# Patient Record
Sex: FEMALE | Race: BLACK OR AFRICAN AMERICAN | Employment: PART TIME | URBAN - METROPOLITAN AREA
[De-identification: names, ages, dates, MRNs, and addresses within clinical notes are randomized per-mention and may not be internally consistent; named-entity substitution may affect disease eponyms.]

---

## 2019-07-25 ENCOUNTER — HOSPITAL ENCOUNTER (EMERGENCY)
Age: 69
Discharge: HOME | End: 2019-07-25
Attending: EMERGENCY MEDICINE
Payer: MEDICARE

## 2019-07-25 VITALS
OXYGEN SATURATION: 100 % | RESPIRATION RATE: 18 BRPM | TEMPERATURE: 98 F | DIASTOLIC BLOOD PRESSURE: 56 MMHG | SYSTOLIC BLOOD PRESSURE: 134 MMHG | BODY MASS INDEX: 33.04 KG/M2 | HEART RATE: 88 BPM | WEIGHT: 218 LBS | HEIGHT: 68 IN

## 2019-07-25 DIAGNOSIS — R10.84 GENERALIZED ABDOMINAL PAIN: Primary | ICD-10-CM

## 2019-07-25 DIAGNOSIS — R19.7 NAUSEA VOMITING AND DIARRHEA: ICD-10-CM

## 2019-07-25 DIAGNOSIS — E86.0 DEHYDRATION: ICD-10-CM

## 2019-07-25 DIAGNOSIS — R11.2 NAUSEA VOMITING AND DIARRHEA: ICD-10-CM

## 2019-07-25 LAB
ABSOLUTE EOS #: 0 K/UL (ref 0–0.4)
ABSOLUTE IMMATURE GRANULOCYTE: 0 K/UL (ref 0–0.3)
ABSOLUTE LYMPH #: 0.86 K/UL (ref 1–4.8)
ABSOLUTE MONO #: 0.11 K/UL (ref 0.2–0.8)
ALBUMIN SERPL-MCNC: 4.3 G/DL (ref 3.5–5.2)
ALBUMIN/GLOBULIN RATIO: NORMAL (ref 1–2.5)
ALP BLD-CCNC: 86 U/L (ref 35–104)
ALT SERPL-CCNC: 21 U/L (ref 5–33)
ANION GAP SERPL CALCULATED.3IONS-SCNC: 12 MMOL/L (ref 9–17)
AST SERPL-CCNC: 22 U/L
BASOPHILS # BLD: 0 %
BASOPHILS ABSOLUTE: 0 K/UL (ref 0–0.2)
BILIRUB SERPL-MCNC: 0.39 MG/DL (ref 0.3–1.2)
BILIRUBIN DIRECT: <0.08 MG/DL
BILIRUBIN, INDIRECT: NORMAL MG/DL (ref 0–1)
BUN BLDV-MCNC: 14 MG/DL (ref 8–23)
BUN/CREAT BLD: 25 (ref 9–20)
CALCIUM SERPL-MCNC: 9.4 MG/DL (ref 8.6–10.4)
CHLORIDE BLD-SCNC: 102 MMOL/L (ref 98–107)
CO2: 23 MMOL/L (ref 20–31)
CREAT SERPL-MCNC: 0.55 MG/DL (ref 0.5–0.9)
DIFFERENTIAL TYPE: ABNORMAL
EOSINOPHILS RELATIVE PERCENT: 0 % (ref 1–4)
GFR AFRICAN AMERICAN: >60 ML/MIN
GFR NON-AFRICAN AMERICAN: >60 ML/MIN
GFR SERPL CREATININE-BSD FRML MDRD: ABNORMAL ML/MIN/{1.73_M2}
GFR SERPL CREATININE-BSD FRML MDRD: ABNORMAL ML/MIN/{1.73_M2}
GLOBULIN: NORMAL G/DL (ref 1.5–3.8)
GLUCOSE BLD-MCNC: 141 MG/DL (ref 70–99)
HCT VFR BLD CALC: 40 % (ref 36.3–47.1)
HEMOGLOBIN: 12.7 G/DL (ref 11.9–15.1)
IMMATURE GRANULOCYTES: 0 %
LIPASE: 28 U/L (ref 13–60)
LYMPHOCYTES # BLD: 8 % (ref 24–44)
MCH RBC QN AUTO: 27.5 PG (ref 25.2–33.5)
MCHC RBC AUTO-ENTMCNC: 31.8 G/DL (ref 28.4–34.8)
MCV RBC AUTO: 86.6 FL (ref 82.6–102.9)
MONOCYTES # BLD: 1 % (ref 1–7)
NRBC AUTOMATED: 0 PER 100 WBC
PDW BLD-RTO: 13.5 % (ref 11.8–14.4)
PLATELET # BLD: 183 K/UL (ref 138–453)
PLATELET ESTIMATE: ABNORMAL
PMV BLD AUTO: 12 FL (ref 8.1–13.5)
POTASSIUM SERPL-SCNC: 3.9 MMOL/L (ref 3.7–5.3)
RBC # BLD: 4.62 M/UL (ref 3.95–5.11)
RBC # BLD: ABNORMAL 10*6/UL
SEG NEUTROPHILS: 91 % (ref 36–66)
SEGMENTED NEUTROPHILS ABSOLUTE COUNT: 9.73 K/UL (ref 1.8–7.7)
SODIUM BLD-SCNC: 137 MMOL/L (ref 135–144)
TOTAL PROTEIN: 7.8 G/DL (ref 6.4–8.3)
WBC # BLD: 10.7 K/UL (ref 3.5–11.3)
WBC # BLD: ABNORMAL 10*3/UL

## 2019-07-25 PROCEDURE — 85025 COMPLETE CBC W/AUTO DIFF WBC: CPT

## 2019-07-25 PROCEDURE — 2580000003 HC RX 258: Performed by: EMERGENCY MEDICINE

## 2019-07-25 PROCEDURE — 6360000002 HC RX W HCPCS: Performed by: EMERGENCY MEDICINE

## 2019-07-25 PROCEDURE — 99284 EMERGENCY DEPT VISIT MOD MDM: CPT

## 2019-07-25 PROCEDURE — 2500000003 HC RX 250 WO HCPCS: Performed by: EMERGENCY MEDICINE

## 2019-07-25 PROCEDURE — 96375 TX/PRO/DX INJ NEW DRUG ADDON: CPT

## 2019-07-25 PROCEDURE — 80076 HEPATIC FUNCTION PANEL: CPT

## 2019-07-25 PROCEDURE — 80048 BASIC METABOLIC PNL TOTAL CA: CPT

## 2019-07-25 PROCEDURE — 96374 THER/PROPH/DIAG INJ IV PUSH: CPT

## 2019-07-25 PROCEDURE — 83690 ASSAY OF LIPASE: CPT

## 2019-07-25 PROCEDURE — 81003 URINALYSIS AUTO W/O SCOPE: CPT

## 2019-07-25 PROCEDURE — 36415 COLL VENOUS BLD VENIPUNCTURE: CPT

## 2019-07-25 RX ORDER — 0.9 % SODIUM CHLORIDE 0.9 %
1000 INTRAVENOUS SOLUTION INTRAVENOUS ONCE
Status: COMPLETED | OUTPATIENT
Start: 2019-07-25 | End: 2019-07-25

## 2019-07-25 RX ORDER — ONDANSETRON 2 MG/ML
4 INJECTION INTRAMUSCULAR; INTRAVENOUS ONCE
Status: COMPLETED | OUTPATIENT
Start: 2019-07-25 | End: 2019-07-25

## 2019-07-25 RX ADMIN — SODIUM CHLORIDE 1000 ML: 9 INJECTION, SOLUTION INTRAVENOUS at 20:34

## 2019-07-25 RX ADMIN — FAMOTIDINE 20 MG: 10 INJECTION, SOLUTION INTRAVENOUS at 20:35

## 2019-07-25 RX ADMIN — ONDANSETRON 4 MG: 2 INJECTION INTRAMUSCULAR; INTRAVENOUS at 20:35

## 2019-07-25 SDOH — HEALTH STABILITY: MENTAL HEALTH: HOW OFTEN DO YOU HAVE A DRINK CONTAINING ALCOHOL?: NEVER

## 2019-07-25 ASSESSMENT — PAIN DESCRIPTION - ORIENTATION: ORIENTATION: MID;UPPER

## 2019-07-25 ASSESSMENT — ENCOUNTER SYMPTOMS
COLOR CHANGE: 0
SHORTNESS OF BREATH: 0
COUGH: 0
NAUSEA: 0
SORE THROAT: 0
CONSTIPATION: 0
VOMITING: 0
DIARRHEA: 0
FACIAL SWELLING: 0
CHEST TIGHTNESS: 0
SINUS PRESSURE: 0

## 2019-07-25 ASSESSMENT — PAIN DESCRIPTION - DESCRIPTORS: DESCRIPTORS: SHARP;STABBING

## 2019-07-25 ASSESSMENT — PAIN DESCRIPTION - LOCATION: LOCATION: ABDOMEN

## 2019-07-25 ASSESSMENT — PAIN SCALES - GENERAL: PAINLEVEL_OUTOF10: 10

## 2019-07-25 ASSESSMENT — PAIN DESCRIPTION - PAIN TYPE: TYPE: ACUTE PAIN

## 2019-07-26 NOTE — ED PROVIDER NOTES
[address] [place] [address] ED  EMERGENCY DEPARTMENT ENCOUNTER      Pt Name: Bonita Badillo  MRN: [id number]  [place] 1950  Date of evaluation: 7/25/19      CHIEF COMPLAINT       Chief Complaint   Patient presents with    Emesis     w/ excessive belching    Abdominal Pain     mid upper abd pain today    Diarrhea     today         HISTORY OF PRESENT ILLNESS    Bonita Badillo is a 68 y.o. female who presents complaining of abdominal pain, nausea, vomiting, diarrhea. 64-year-old -American female presents emergency department with the above-mentioned concerns. Patient states she had a normal day she ate normal food she just felt like she was getting little weak perhaps even a little dehydrated while she was at work. This subsequently then led to some nausea vomiting and diarrhea. At this time the patient is alert and in no acute distress. Here today for further evaluation and treatment. REVIEW OF SYSTEMS       Review of Systems   Constitutional: Negative for chills, diaphoresis and fever. HENT: Negative for facial swelling, sinus pressure and sore throat. Eyes: Negative for visual disturbance. Respiratory: Negative for cough, chest tightness and shortness of breath. Cardiovascular: Negative for chest pain. Gastrointestinal: Negative for constipation, diarrhea, nausea and vomiting. Musculoskeletal: Negative for arthralgias and myalgias. Skin: Negative for color change and rash. Neurological: Negative for weakness and headaches. Psychiatric/Behavioral: Negative for agitation, hallucinations and self-injury.        PAST MEDICAL HISTORY     Past Medical History:   Diagnosis Date    [place] (hard of hearing)     on 7/25/19 pt states she left her hearing aides at home       SURGICAL HISTORY       Past Surgical History:   Procedure Laterality Date    HYSTERECTOMY         CURRENT MEDICATIONS       Previous Medications    No medications on file       ALLERGIES     has No Known

## 2021-06-28 ENCOUNTER — APPOINTMENT (OUTPATIENT)
Dept: CT IMAGING | Age: 71
End: 2021-06-28
Payer: MEDICARE

## 2021-06-28 ENCOUNTER — HOSPITAL ENCOUNTER (EMERGENCY)
Age: 71
Discharge: HOME | End: 2021-06-28
Attending: EMERGENCY MEDICINE
Payer: MEDICARE

## 2021-06-28 ENCOUNTER — APPOINTMENT (OUTPATIENT)
Dept: GENERAL RADIOLOGY | Age: 71
End: 2021-06-28
Payer: MEDICARE

## 2021-06-28 VITALS
OXYGEN SATURATION: 97 % | HEART RATE: 97 BPM | RESPIRATION RATE: 16 BRPM | TEMPERATURE: 98.2 F | WEIGHT: 220 LBS | DIASTOLIC BLOOD PRESSURE: 71 MMHG | HEIGHT: 68 IN | SYSTOLIC BLOOD PRESSURE: 165 MMHG | BODY MASS INDEX: 33.34 KG/M2

## 2021-06-28 DIAGNOSIS — R51.9 ACUTE NONINTRACTABLE HEADACHE, UNSPECIFIED HEADACHE TYPE: Primary | ICD-10-CM

## 2021-06-28 DIAGNOSIS — J06.9 VIRAL URI: ICD-10-CM

## 2021-06-28 LAB
ABSOLUTE EOS #: 0.03 K/UL (ref 0–0.44)
ABSOLUTE IMMATURE GRANULOCYTE: 0.02 K/UL (ref 0–0.3)
ABSOLUTE LYMPH #: 1.29 K/UL (ref 1.1–3.7)
ABSOLUTE MONO #: 0.29 K/UL (ref 0.1–1.2)
ANION GAP SERPL CALCULATED.3IONS-SCNC: 12 MMOL/L (ref 9–17)
BASOPHILS # BLD: 0 % (ref 0–2)
BASOPHILS ABSOLUTE: <0.03 K/UL (ref 0–0.2)
BUN BLDV-MCNC: 8 MG/DL (ref 8–23)
BUN/CREAT BLD: 15 (ref 9–20)
CALCIUM SERPL-MCNC: 9.8 MG/DL (ref 8.6–10.4)
CHLORIDE BLD-SCNC: 104 MMOL/L (ref 98–107)
CO2: 24 MMOL/L (ref 20–31)
CREAT SERPL-MCNC: 0.52 MG/DL (ref 0.5–0.9)
DIFFERENTIAL TYPE: ABNORMAL
EOSINOPHILS RELATIVE PERCENT: 0 % (ref 1–4)
GFR AFRICAN AMERICAN: >60 ML/MIN
GFR NON-AFRICAN AMERICAN: >60 ML/MIN
GFR SERPL CREATININE-BSD FRML MDRD: ABNORMAL ML/MIN/{1.73_M2}
GFR SERPL CREATININE-BSD FRML MDRD: ABNORMAL ML/MIN/{1.73_M2}
GLUCOSE BLD-MCNC: 115 MG/DL (ref 70–99)
HCT VFR BLD CALC: 38.5 % (ref 36.3–47.1)
HEMOGLOBIN: 12.2 G/DL (ref 11.9–15.1)
IMMATURE GRANULOCYTES: 0 %
LYMPHOCYTES # BLD: 19 % (ref 24–43)
MCH RBC QN AUTO: 27.7 PG (ref 25.2–33.5)
MCHC RBC AUTO-ENTMCNC: 31.7 G/DL (ref 28.4–34.8)
MCV RBC AUTO: 87.5 FL (ref 82.6–102.9)
MONOCYTES # BLD: 4 % (ref 3–12)
NRBC AUTOMATED: 0 PER 100 WBC
PDW BLD-RTO: 13.5 % (ref 11.8–14.4)
PLATELET # BLD: 202 K/UL (ref 138–453)
PLATELET ESTIMATE: ABNORMAL
PMV BLD AUTO: 11.2 FL (ref 8.1–13.5)
POTASSIUM SERPL-SCNC: 4.4 MMOL/L (ref 3.7–5.3)
RBC # BLD: 4.4 M/UL (ref 3.95–5.11)
RBC # BLD: ABNORMAL 10*6/UL
SARS-COV-2, RAPID: NOT DETECTED
SEG NEUTROPHILS: 77 % (ref 36–65)
SEGMENTED NEUTROPHILS ABSOLUTE COUNT: 5.24 K/UL (ref 1.5–8.1)
SODIUM BLD-SCNC: 140 MMOL/L (ref 135–144)
SPECIMEN DESCRIPTION: NORMAL
TROPONIN INTERP: NORMAL
TROPONIN T: NORMAL NG/ML
TROPONIN, HIGH SENSITIVITY: 12 NG/L (ref 0–14)
WBC # BLD: 6.9 K/UL (ref 3.5–11.3)
WBC # BLD: ABNORMAL 10*3/UL

## 2021-06-28 PROCEDURE — 99283 EMERGENCY DEPT VISIT LOW MDM: CPT

## 2021-06-28 PROCEDURE — 80048 BASIC METABOLIC PNL TOTAL CA: CPT

## 2021-06-28 PROCEDURE — 70450 CT HEAD/BRAIN W/O DYE: CPT

## 2021-06-28 PROCEDURE — 93005 ELECTROCARDIOGRAM TRACING: CPT | Performed by: EMERGENCY MEDICINE

## 2021-06-28 PROCEDURE — 71045 X-RAY EXAM CHEST 1 VIEW: CPT

## 2021-06-28 PROCEDURE — 87635 SARS-COV-2 COVID-19 AMP PRB: CPT

## 2021-06-28 PROCEDURE — 84484 ASSAY OF TROPONIN QUANT: CPT

## 2021-06-28 PROCEDURE — 85025 COMPLETE CBC W/AUTO DIFF WBC: CPT

## 2021-06-28 ASSESSMENT — ENCOUNTER SYMPTOMS
SINUS PAIN: 0
COLOR CHANGE: 0
EYES NEGATIVE: 1
CHEST TIGHTNESS: 0
DIARRHEA: 0
VOMITING: 0
SORE THROAT: 1
COUGH: 1
SHORTNESS OF BREATH: 0
CONSTIPATION: 0
APNEA: 0
ABDOMINAL DISTENTION: 0

## 2021-06-28 ASSESSMENT — PAIN SCALES - GENERAL: PAINLEVEL_OUTOF10: 8

## 2021-06-28 ASSESSMENT — PAIN DESCRIPTION - LOCATION: LOCATION: HEAD

## 2021-06-28 ASSESSMENT — PAIN DESCRIPTION - DESCRIPTORS: DESCRIPTORS: ACHING

## 2021-06-28 ASSESSMENT — PAIN DESCRIPTION - PAIN TYPE: TYPE: ACUTE PAIN

## 2021-06-29 LAB
EKG ATRIAL RATE: 72 BPM
EKG P AXIS: 68 DEGREES
EKG P-R INTERVAL: 160 MS
EKG Q-T INTERVAL: 394 MS
EKG QRS DURATION: 82 MS
EKG QTC CALCULATION (BAZETT): 431 MS
EKG R AXIS: -7 DEGREES
EKG T AXIS: 56 DEGREES
EKG VENTRICULAR RATE: 72 BPM

## 2021-06-29 PROCEDURE — 93010 ELECTROCARDIOGRAM REPORT: CPT | Performed by: INTERNAL MEDICINE

## 2021-06-29 NOTE — ED PROVIDER NOTES
EMERGENCY DEPARTMENT ENCOUNTER    Pt Name: Jaison Houston  MRN: [id number]  [place] 1950  Date of evaluation: 6/28/21  CHIEF COMPLAINT       Chief Complaint   Patient presents with    Headache     pt has been having a headache for the last few days, increase weakness     Sinusitis     HISTORY OF PRESENT ILLNESS   75-year-old female presents emergency room for episode of \"wooziness. \"  Patient has had a sore throat and some congestion with intermittent headache over the last couple of days. Today when she got up she felt suddenly lightheaded. She had called EMS who came out to evaluate her and stated that she should go to the emergency room. She had a bit of a headache this afternoon but headache is gone now. She states that she lost her voice this week as well. Patient does not have any fevers. She does report slight cough. No body aches or headaches at this time. REVIEW OF SYSTEMS     Review of Systems   Constitutional: Negative for activity change, chills and diaphoresis. HENT: Positive for sore throat. Negative for congestion, sinus pain and tinnitus. Eyes: Negative. Respiratory: Positive for cough. Negative for apnea, chest tightness and shortness of breath. Gastrointestinal: Negative for abdominal distention, constipation, diarrhea and vomiting. Genitourinary: Negative for difficulty urinating and frequency. Musculoskeletal: Negative for arthralgias and myalgias. Skin: Negative for color change and rash. Neurological: Positive for light-headedness. Negative for dizziness. Hematological: Negative. Psychiatric/Behavioral: Negative. PASTMEDICAL HISTORY     Past Medical History:   Diagnosis Date    [place] (hard of hearing)     on 7/25/19 pt states she left her hearing aides at home     Past Problem List  There is no problem list on file for this patient.     SURGICAL HISTORY       Past Surgical History:   Procedure Laterality Date    HYSTERECTOMY CURRENT MEDICATIONS       Previous Medications    No medications on file     ALLERGIES     has No Known Allergies. FAMILY HISTORY     has no family status information on file. SOCIAL HISTORY       Social History     Tobacco Use    Smoking status: Never Smoker   Substance Use Topics    Alcohol use: Never    Drug use: Never     PHYSICAL EXAM     INITIAL VITALS: BP (!) 165/71   Pulse 97   Temp 98.2 °F (36.8 °C) (Oral)   Resp 16   Ht 5' 8\" (1.727 m)   Wt 220 lb (99.8 kg)   SpO2 97%   BMI 33.45 kg/m²    Physical Exam  Constitutional:       General: She is not in acute distress. Appearance: She is well-developed. HENT:      Head: Normocephalic. Eyes:      Pupils: Pupils are equal, round, and reactive to light. Cardiovascular:      Rate and Rhythm: Normal rate and regular rhythm. Heart sounds: Normal heart sounds. Pulmonary:      Effort: Pulmonary effort is normal. No respiratory distress. Breath sounds: Normal breath sounds. Abdominal:      General: Bowel sounds are normal.      Palpations: Abdomen is soft. Tenderness: There is no abdominal tenderness. Musculoskeletal:         General: Normal range of motion. Skin:     General: Skin is warm and dry. Neurological:      Mental Status: She is alert and oriented to person, place, and time. MEDICAL DECISION MAKING:     Overall well-appearing 25-year-old female presenting to the emergency room for episode of lightheadedness and slight headache this afternoon. Symptoms do seem to be improved here during my evaluation with the patient. She has normal neurologic exam she does not have any ataxia when ambulating. Patient reporting mild URI symptoms as well. Vitals are overall within normal limits other than blood pressure. She does not have a fever here. Blood work is overall within normal limits. No significant EKG changes. CT head is negative for acute pathology.   Given her clinical evaluation blood work and vitals I do not feel patient requires admission at this time. Patient discharged with PCP follow-up instructions and return precautions. CRITICAL CARE:       PROCEDURES:    Procedures    DIAGNOSTIC RESULTS   EKG:All EKG's are interpreted by the Emergency Department Physician who either signs or Co-signs this chart in the absence of a cardiologist.    EKG-poor quality due to motion artifact  Sinus rhythm 72  Minimal voltage criteria for LVH  T wave flattening lead III  No ST changes    QTc 431    RADIOLOGY:All plain film, CT, MRI, and formal ultrasound images (except ED bedside ultrasound) are read by the radiologist, see reports below, unless otherwisenoted in MDM or here. CT HEAD WO CONTRAST   Final Result   No acute intracranial abnormality. XR CHEST PORTABLE   Final Result   No acute process. LABS: All lab results were reviewed by myself, and all abnormals are listed below. Labs Reviewed   CBC WITH AUTO DIFFERENTIAL - Abnormal; Notable for the following components:       Result Value    Seg Neutrophils 77 (*)     Lymphocytes 19 (*)     Eosinophils % 0 (*)     All other components within normal limits   BASIC METABOLIC PANEL W/ REFLEX TO MG FOR LOW K - Abnormal; Notable for the following components:    Glucose 115 (*)     All other components within normal limits   COVID-19, RAPID   TROPONIN       EMERGENCY DEPARTMENTCOURSE:         Vitals:    Vitals:    06/28/21 1649 06/28/21 2149   BP: (!) 170/88 (!) 165/71   Pulse: 82 97   Resp: 18 16   Temp: 98.2 °F (36.8 °C)    TempSrc: Oral    SpO2: 100% 97%   Weight: 220 lb (99.8 kg)    Height: 5' 8\" (1.727 m)        The patient was given the following medications while in the emergency department:  No orders of the defined types were placed in this encounter. CONSULTS:  None    FINAL IMPRESSION      1. Acute nonintractable headache, unspecified headache type    2.  Viral URI          DISPOSITION/PLAN   DISPOSITION Decision To Discharge 06/28/2021 10:22:18 PM      PATIENT REFERRED TO:  No follow-up provider specified.   DISCHARGE MEDICATIONS:  New Prescriptions    No medications on file     Sury Zayas MD  Attending Emergency Physician                 Clifton Mitchell MD  06/28/21 1740

## 2021-12-27 ENCOUNTER — APPOINTMENT (OUTPATIENT)
Dept: GENERAL RADIOLOGY | Age: 71
DRG: 177 | End: 2021-12-27
Payer: MEDICARE

## 2021-12-27 ENCOUNTER — APPOINTMENT (OUTPATIENT)
Dept: CT IMAGING | Age: 71
DRG: 177 | End: 2021-12-27
Payer: MEDICARE

## 2021-12-27 ENCOUNTER — HOSPITAL ENCOUNTER (INPATIENT)
Age: 71
LOS: 1 days | Discharge: HOME | DRG: 177 | End: 2021-12-28
Attending: INTERNAL MEDICINE | Admitting: INTERNAL MEDICINE
Payer: MEDICARE

## 2021-12-27 DIAGNOSIS — U07.1 COVID-19: Primary | ICD-10-CM

## 2021-12-27 PROBLEM — R20.2 NUMBNESS AND TINGLING OF BOTH LEGS BELOW KNEES: Status: ACTIVE | Noted: 2021-12-27

## 2021-12-27 PROBLEM — R20.0 NUMBNESS AND TINGLING OF BOTH LEGS BELOW KNEES: Status: ACTIVE | Noted: 2021-12-27

## 2021-12-27 LAB
% CKMB: 1.5 % (ref 0–3)
ABSOLUTE EOS #: <0.03 K/UL (ref 0–0.44)
ABSOLUTE IMMATURE GRANULOCYTE: <0.03 K/UL (ref 0–0.3)
ABSOLUTE LYMPH #: 0.66 K/UL (ref 1.1–3.7)
ABSOLUTE MONO #: 0.27 K/UL (ref 0.1–1.2)
ALLEN TEST: ABNORMAL
ANION GAP SERPL CALCULATED.3IONS-SCNC: 13 MMOL/L (ref 9–17)
ANION GAP: 10 MMOL/L (ref 7–16)
BASOPHILS # BLD: 0 % (ref 0–2)
BASOPHILS ABSOLUTE: <0.03 K/UL (ref 0–0.2)
BUN BLDV-MCNC: 16 MG/DL (ref 8–23)
BUN/CREAT BLD: ABNORMAL (ref 9–20)
C-REACTIVE PROTEIN: 11.3 MG/L (ref 0–5)
CALCIUM SERPL-MCNC: 8.9 MG/DL (ref 8.6–10.4)
CHLORIDE BLD-SCNC: 103 MMOL/L (ref 98–107)
CK MB: 4.3 NG/ML
CKMB INTERPRETATION: ABNORMAL
CO2: 21 MMOL/L (ref 20–31)
CREAT SERPL-MCNC: 0.68 MG/DL (ref 0.5–0.9)
D-DIMER QUANTITATIVE: 0.47 MG/L FEU
DIFFERENTIAL TYPE: ABNORMAL
EOSINOPHILS RELATIVE PERCENT: 0 % (ref 1–4)
FERRITIN: 298 UG/L (ref 13–150)
FIBRINOGEN: 435 MG/DL (ref 140–420)
FIO2: ABNORMAL
GFR AFRICAN AMERICAN: >60 ML/MIN
GFR NON-AFRICAN AMERICAN: >60 ML/MIN
GFR NON-AFRICAN AMERICAN: >60 ML/MIN
GFR SERPL CREATININE-BSD FRML MDRD: >60 ML/MIN
GFR SERPL CREATININE-BSD FRML MDRD: ABNORMAL ML/MIN/{1.73_M2}
GFR SERPL CREATININE-BSD FRML MDRD: ABNORMAL ML/MIN/{1.73_M2}
GFR SERPL CREATININE-BSD FRML MDRD: NORMAL ML/MIN/{1.73_M2}
GLUCOSE BLD-MCNC: 130 MG/DL (ref 70–99)
GLUCOSE BLD-MCNC: 133 MG/DL (ref 74–100)
HCO3 VENOUS: 28.5 MMOL/L (ref 22–29)
HCT VFR BLD CALC: 38.7 % (ref 36.3–47.1)
HEMOGLOBIN: 12.3 G/DL (ref 11.9–15.1)
IMMATURE GRANULOCYTES: 0 %
INR BLD: 1
LACTATE DEHYDROGENASE: 365 U/L (ref 135–214)
LYMPHOCYTES # BLD: 16 % (ref 24–43)
MCH RBC QN AUTO: 27.6 PG (ref 25.2–33.5)
MCHC RBC AUTO-ENTMCNC: 31.8 G/DL (ref 28.4–34.8)
MCV RBC AUTO: 87 FL (ref 82.6–102.9)
MODE: ABNORMAL
MONOCYTES # BLD: 7 % (ref 3–12)
MYOGLOBIN: 98 NG/ML (ref 25–58)
NEGATIVE BASE EXCESS, VEN: ABNORMAL (ref 0–2)
NRBC AUTOMATED: 0 PER 100 WBC
O2 DEVICE/FLOW/%: ABNORMAL
O2 SAT, VEN: 38 % (ref 60–85)
PARTIAL THROMBOPLASTIN TIME: 28 SEC (ref 20.5–30.5)
PATIENT TEMP: ABNORMAL
PCO2, VEN: 51 MM HG (ref 41–51)
PDW BLD-RTO: 13.4 % (ref 11.8–14.4)
PH VENOUS: 7.36 (ref 7.32–7.43)
PLATELET # BLD: 144 K/UL (ref 138–453)
PLATELET ESTIMATE: ABNORMAL
PMV BLD AUTO: 11.7 FL (ref 8.1–13.5)
PO2, VEN: 23.4 MM HG (ref 30–50)
POC BUN: 16 MG/DL (ref 8–26)
POC CHLORIDE: 105 MMOL/L (ref 98–107)
POC CREATININE: 0.76 MG/DL (ref 0.51–1.19)
POC HEMATOCRIT: 42 % (ref 36–46)
POC HEMOGLOBIN: 14.3 G/DL (ref 12–16)
POC IONIZED CALCIUM: 1.24 MMOL/L (ref 1.15–1.33)
POC LACTIC ACID: 0.77 MMOL/L (ref 0.56–1.39)
POC PCO2 TEMP: ABNORMAL MM HG
POC PH TEMP: ABNORMAL
POC PO2 TEMP: ABNORMAL MM HG
POC POTASSIUM: 4 MMOL/L (ref 3.5–4.5)
POC SODIUM: 143 MMOL/L (ref 138–146)
POC TCO2: 29 MMOL/L (ref 22–30)
POSITIVE BASE EXCESS, VEN: 2 (ref 0–3)
POTASSIUM SERPL-SCNC: 3.9 MMOL/L (ref 3.7–5.3)
PROCALCITONIN: 0.05 NG/ML
PROTHROMBIN TIME: 10.4 SEC (ref 9.1–12.3)
RBC # BLD: 4.45 M/UL (ref 3.95–5.11)
RBC # BLD: ABNORMAL 10*6/UL
SAMPLE SITE: ABNORMAL
SARS-COV-2, RAPID: DETECTED
SEG NEUTROPHILS: 77 % (ref 36–65)
SEGMENTED NEUTROPHILS ABSOLUTE COUNT: 3.19 K/UL (ref 1.5–8.1)
SODIUM BLD-SCNC: 137 MMOL/L (ref 135–144)
SPECIMEN DESCRIPTION: ABNORMAL
TOTAL CK: 284 U/L (ref 26–192)
TOTAL CO2, VENOUS: ABNORMAL MMOL/L (ref 23–30)
TROPONIN INTERP: ABNORMAL
TROPONIN INTERP: ABNORMAL
TROPONIN T: ABNORMAL NG/ML
TROPONIN T: ABNORMAL NG/ML
TROPONIN, HIGH SENSITIVITY: 15 NG/L (ref 0–14)
TROPONIN, HIGH SENSITIVITY: 19 NG/L (ref 0–14)
WBC # BLD: 4.1 K/UL (ref 3.5–11.3)
WBC # BLD: ABNORMAL 10*3/UL

## 2021-12-27 PROCEDURE — 70450 CT HEAD/BRAIN W/O DYE: CPT

## 2021-12-27 PROCEDURE — 99221 1ST HOSP IP/OBS SF/LOW 40: CPT | Performed by: INTERNAL MEDICINE

## 2021-12-27 PROCEDURE — 70496 CT ANGIOGRAPHY HEAD: CPT

## 2021-12-27 PROCEDURE — 99285 EMERGENCY DEPT VISIT HI MDM: CPT

## 2021-12-27 PROCEDURE — 83874 ASSAY OF MYOGLOBIN: CPT

## 2021-12-27 PROCEDURE — 84484 ASSAY OF TROPONIN QUANT: CPT

## 2021-12-27 PROCEDURE — 6360000004 HC RX CONTRAST MEDICATION: Performed by: STUDENT IN AN ORGANIZED HEALTH CARE EDUCATION/TRAINING PROGRAM

## 2021-12-27 PROCEDURE — 86140 C-REACTIVE PROTEIN: CPT

## 2021-12-27 PROCEDURE — 84145 PROCALCITONIN (PCT): CPT

## 2021-12-27 PROCEDURE — XW033H5 INTRODUCTION OF TOCILIZUMAB INTO PERIPHERAL VEIN, PERCUTANEOUS APPROACH, NEW TECHNOLOGY GROUP 5: ICD-10-PCS | Performed by: INTERNAL MEDICINE

## 2021-12-27 PROCEDURE — 71045 X-RAY EXAM CHEST 1 VIEW: CPT

## 2021-12-27 PROCEDURE — 82803 BLOOD GASES ANY COMBINATION: CPT

## 2021-12-27 PROCEDURE — 85379 FIBRIN DEGRADATION QUANT: CPT

## 2021-12-27 PROCEDURE — 87635 SARS-COV-2 COVID-19 AMP PRB: CPT

## 2021-12-27 PROCEDURE — 2060000000 HC ICU INTERMEDIATE R&B

## 2021-12-27 PROCEDURE — 82728 ASSAY OF FERRITIN: CPT

## 2021-12-27 PROCEDURE — 80048 BASIC METABOLIC PNL TOTAL CA: CPT

## 2021-12-27 PROCEDURE — 82330 ASSAY OF CALCIUM: CPT

## 2021-12-27 PROCEDURE — 85384 FIBRINOGEN ACTIVITY: CPT

## 2021-12-27 PROCEDURE — 85730 THROMBOPLASTIN TIME PARTIAL: CPT

## 2021-12-27 PROCEDURE — 83615 LACTATE (LD) (LDH) ENZYME: CPT

## 2021-12-27 PROCEDURE — 84520 ASSAY OF UREA NITROGEN: CPT

## 2021-12-27 PROCEDURE — 71250 CT THORAX DX C-: CPT

## 2021-12-27 PROCEDURE — 85014 HEMATOCRIT: CPT

## 2021-12-27 PROCEDURE — 96374 THER/PROPH/DIAG INJ IV PUSH: CPT

## 2021-12-27 PROCEDURE — 85025 COMPLETE CBC W/AUTO DIFF WBC: CPT

## 2021-12-27 PROCEDURE — 82565 ASSAY OF CREATININE: CPT

## 2021-12-27 PROCEDURE — 36415 COLL VENOUS BLD VENIPUNCTURE: CPT

## 2021-12-27 PROCEDURE — 99223 1ST HOSP IP/OBS HIGH 75: CPT | Performed by: PSYCHIATRY & NEUROLOGY

## 2021-12-27 PROCEDURE — 82550 ASSAY OF CK (CPK): CPT

## 2021-12-27 PROCEDURE — 85610 PROTHROMBIN TIME: CPT

## 2021-12-27 PROCEDURE — 83605 ASSAY OF LACTIC ACID: CPT

## 2021-12-27 PROCEDURE — 80051 ELECTROLYTE PANEL: CPT

## 2021-12-27 PROCEDURE — 82553 CREATINE MB FRACTION: CPT

## 2021-12-27 PROCEDURE — 6360000002 HC RX W HCPCS: Performed by: STUDENT IN AN ORGANIZED HEALTH CARE EDUCATION/TRAINING PROGRAM

## 2021-12-27 PROCEDURE — 99222 1ST HOSP IP/OBS MODERATE 55: CPT | Performed by: INTERNAL MEDICINE

## 2021-12-27 PROCEDURE — 82947 ASSAY GLUCOSE BLOOD QUANT: CPT

## 2021-12-27 RX ORDER — DEXAMETHASONE SODIUM PHOSPHATE 10 MG/ML
6 INJECTION INTRAMUSCULAR; INTRAVENOUS ONCE
Status: COMPLETED | OUTPATIENT
Start: 2021-12-27 | End: 2021-12-27

## 2021-12-27 RX ORDER — DEXAMETHASONE SODIUM PHOSPHATE 10 MG/ML
6 INJECTION INTRAMUSCULAR; INTRAVENOUS EVERY 24 HOURS
Status: DISCONTINUED | OUTPATIENT
Start: 2021-12-28 | End: 2021-12-28 | Stop reason: HOSPADM

## 2021-12-27 RX ADMIN — IOPAMIDOL 90 ML: 755 INJECTION, SOLUTION INTRAVENOUS at 14:08

## 2021-12-27 RX ADMIN — DEXAMETHASONE SODIUM PHOSPHATE 6 MG: 10 INJECTION, SOLUTION INTRAMUSCULAR; INTRAVENOUS at 14:50

## 2021-12-27 ASSESSMENT — ENCOUNTER SYMPTOMS
RHINORRHEA: 1
COUGH: 0
NAUSEA: 0
EYE REDNESS: 0
ABDOMINAL PAIN: 0
SINUS PAIN: 0
CHEST TIGHTNESS: 0
SORE THROAT: 0
CONSTIPATION: 0
FACIAL SWELLING: 0
BACK PAIN: 0
WHEEZING: 0
COLOR CHANGE: 0
BLOOD IN STOOL: 0
RHINORRHEA: 0
SHORTNESS OF BREATH: 0
EYES NEGATIVE: 1
APNEA: 0
CHOKING: 0
ABDOMINAL DISTENTION: 0
DIARRHEA: 0
VOMITING: 0
SINUS PRESSURE: 0

## 2021-12-27 NOTE — PROGRESS NOTES
RN called to get report RN was not available at the time.  ED RN will call Car 2 nurse back when available

## 2021-12-27 NOTE — ED NOTES
Bed: 30  Expected date:   Expected time:   Means of arrival:   Comments:     Jenna Mcclendon RN  12/27/21 [id number]

## 2021-12-27 NOTE — CONSULTS
attention         PAST MEDICAL HISTORY :       Past Medical History:        Diagnosis Date    [place] (hard of hearing)     on 7/25/19 pt states she left her hearing aides at home       Past Surgical History:        Procedure Laterality Date    HYSTERECTOMY         Social History:   Social History     Socioeconomic History    Marital status: Single     Spouse name: Not on file    Number of children: Not on file    Years of education: Not on file    Highest education level: Not on file   Occupational History    Not on file   Tobacco Use    Smoking status: Never Smoker    Smokeless tobacco: Not on file   Substance and Sexual Activity    Alcohol use: Never    Drug use: Never    Sexual activity: Not on file   Other Topics Concern    Not on file   Social History Narrative    Not on file     Social Determinants of Health     Financial Resource Strain:     Difficulty of Paying Living Expenses: Not on file   Food Insecurity:     Worried About [address] in the Last Year: Not on file    [address] [employer] in the Last Year: Not on file   Transportation Needs:     Lack of Transportation (Medical): Not on file    Lack of Transportation (Non-Medical):  Not on file   Physical Activity:     Days of Exercise per Week: Not on file    Minutes of Exercise per Session: Not on file   Stress:     Feeling of Stress : Not on file   Social Connections:     Frequency of Communication with Friends and Family: Not on file    Frequency of Social Gatherings with Friends and Family: Not on file    Attends Congregation Services: Not on file    Active Member of Clubs or Organizations: Not on file    Attends Club or Organization Meetings: Not on file    Marital Status: Not on file   Intimate Partner Violence:     Fear of Current or Ex-Partner: Not on file    Emotionally Abused: Not on file    Physically Abused: Not on file    Sexually Abused: Not on file   Housing Stability:     Unable to Pay for Housing in the Last Year: Not on file    Number of Places Lived in the Last Year: Not on file    Unstable Housing in the Last Year: Not on file       Family History:   No family history on file. Allergies:  Patient has no known allergies. Home Medications:  Prior to Admission medications    Not on File       Current Medications:   No current facility-administered medications for this encounter. REVIEW OF SYSTEMS:       CONSTITUTIONAL:  Positive for fatigue and malaise   EYES: negative for double vision and photophobia    HEENT: negative for tinnitus and sore throat   RESPIRATORY: negative for cough, positive shortness of breath   CARDIOVASCULAR: negative for chest pain, palpitations   GASTROINTESTINAL: negative for nausea, vomiting   GENITOURINARY: negative for incontinence   MUSCULOSKELETAL: negative for neck or back pain   NEUROLOGICAL: negative for seizures   PSYCHIATRIC: negative for fatigue     Review of systems otherwise negative. PHYSICAL EXAM:       /69   Pulse 83   Temp 99.3 °F (37.4 °C) (Oral)   Resp 15   Ht 5' 8\" (1.727 m)   Wt 205 lb (93 kg)   SpO2 99%   BMI 31.17 kg/m²     CONSTITUTIONAL:  Well developed, well nourished, alert and oriented x 3, in no acute distress. GCS 15, nontoxic. No dysarthria, no aphasia. EOMI.     HEAD:  normocephalic, atraumatic    EYES:  PERRLA, EOMI.   ENT:  moist mucous membranes   NECK:  supple, symmetric, no midline tenderness to palpation    BACK:  without midline tenderness, step-offs or deformities    LUNGS:  Equal air entry bilaterally   CARDIOVASCULAR:  normal s1 / s2   ABDOMEN:  Soft, no rigidity   NEUROLOGIC:  Mental Status:  A & O x3,awake             Cranial Nerves:    cranial nerves II-XII are grossly intact    Motor Exam:    Drift:  absent  Tone:  normal    Motor exam is symmetrical 5 out of 5 all extremities bilaterally    Sensory:    Touch:    Right Upper Extremity:  normal  Left Upper Extremity:  normal  Right Lower Extremity:  normal  Left Lower to right side, dizzy, L tremor FINDINGS: BRAIN/VENTRICLES: Examination is mildly degraded by artifact traversing the superior brain parenchyma. There is no acute intracranial hemorrhage, mass effect or midline shift. No abnormal extra-axial fluid collection. The gray-white differentiation appears grossly maintained without evidence of an acute infarct. There is no evidence of hydrocephalus. ORBITS: The visualized portion of the orbits demonstrate no acute abnormality. SINUSES: The visualized paranasal sinuses and mastoid air cells demonstrate chronic near complete opacification of the right sphenoid sinus with mucoperiosteal thickening with some superimposed aerated secretions. SOFT TISSUES/SKULL:  No acute abnormality of the visualized skull or soft tissues. No acute intracranial abnormality. Possible acute on chronic right sphenoid sinusitis. XR CHEST PORTABLE    Result Date: 12/27/2021  EXAMINATION: ONE XRAY VIEW OF THE CHEST 12/27/2021 10:47 am COMPARISON: 06/28/2021 HISTORY: ORDERING SYSTEM PROVIDED HISTORY: hypoxic TECHNOLOGIST PROVIDED HISTORY: hypoxic Reason for Exam: port upright FINDINGS: . The cardiac size is normal. No acute infiltrates or pleural effusions are seen. Pulmonary vascularity appears mildly congested more notably in the mid and lower lung zones. There is mild ectasia of the thoracic aorta. There are degenerative changes in the spine . No acute bony abnormalities. The hilar structures are normal.     Mild pulmonary vascular congestion     CTA HEAD NECK W CONTRAST    Result Date: 12/27/2021  EXAMINATION: CTA OF THE HEAD AND NECK WITH CONTRAST, 12/27/2021 1:42 pm TECHNIQUE: CTA of the head and neck was performed with the administration of intravenous contrast. Multiplanar reformatted images are provided for review. MIP images are provided for review. Stenosis of the internal carotid arteries measured using NASCET criteria.  Dose modulation, iterative reconstruction, and/or weight based adjustment of the mA/kV was utilized to reduce the radiation dose to as low as reasonably achievable. COMPARISON: None HISTORY: ORDERING SYSTEM PROVIDED HISTORY: Leroy numbness, falling to right side, dizzy, r>L tremor TECHNOLOGIST PROVIDED HISTORY: [place] numbness, falling to right side, dizzy, r>L tremor Decision Support Exception - unselect if not a suspected or confirmed emergency medical condition->Emergency Medical Condition (MA) Reason for Exam: Bilateral numbness, falling to right side, dizzy, L tremor. FINDINGS: CTA NECK: AORTIC ARCH/ARCH VESSELS: No dissection or arterial injury. No significant stenosis of the brachiocephalic or subclavian arteries. CAROTID ARTERIES: No dissection, arterial injury, or hemodynamically significant stenosis by NASCET criteria. VERTEBRAL ARTERIES: Moderate stenosis of the right vertebral artery origin due to calcified plaque. Left vertebral artery is patent. SOFT TISSUES: Two small areas of nonspecific ground-glass opacity in the periphery of the upper lobes of the lungs bilaterally. No neck mass is identified. BONES: There is chronic right sphenoid sinusitis. There is advanced dental disease with caries and periapical lucencies. 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 No acute osseous abnormality. CTA HEAD: ANTERIOR CIRCULATION: There is mild stenosis of the left middle cerebral artery M1 segment. The more distal branches are patent. The right middle cerebral artery is patent. Both anterior cerebral artery patent. POSTERIOR CIRCULATION: No significant stenosis of the vertebral, basilar, or posterior cerebral arteries. No aneurysm. OTHER: No dural venous sinus thrombosis on this non-dedicated study. BRAIN: See noncontrast head CT. No large vessel occlusion in the head or neck.          ASSESSMENT AND PLAN:       Patient Active Problem List   Diagnosis    COVID-19       66 y.o. female who presents with generalized weakness, has Covid pneumonia, requiring oxygen. No focal weakness or numbness or any neurological deficit concerning for acute or subacute stroke. CT and CTA unremarkable. No further test indicated at this time. Continue full support for Covid. Patient has a mild physiologic tremor with some intention component, her physiological tremor is enhanced with anxiety. She is currently not experiencing t any disabling symptoms and does not want to be necessarily started on any tremor medication. She can follow-up with neurology outpatient and can be offered medications if tremors become problematic.     - Telemetry    - Neuro checks per protocol  - We recommend SBP normotensive  - Blood glucose goal less than 180  - Please avoid dextrose containing solutions    Additional recommendations may follow    Please contact [facility] with any changes in patients neurologic status. Thank you for your consult.        Lexi Villatoro MD   12/27/2021  3:19 PM

## 2021-12-27 NOTE — ED PROVIDER NOTES
[facility]  ED  Emergency Department Encounter  Emergency Medicine Resident     Pt Name: Nino Branch  MRN: [id number]  [place] 1950  Date of evaluation: 12/27/21  PCP:  No primary care provider on file. CHIEF COMPLAINT       Chief Complaint   Patient presents with    Numbness     bilateral legs     Gait Problem     falling to the right side        HISTORY OFPRESENT ILLNESS  (Location/Symptom, Timing/Onset, Context/Setting, Quality, Duration, Modifying Factors,Severity.)      Nino Branch is a 66 y.o. female with no past medical history presents with her daughter for complaints of numbness, ataxia. Patient's daughter reports that over the last 3 weeks patient has had bilateral lower extremity numbness. Throughout this timeframe she has had progressively worsening ambulation. Daughter became concerned as it acutely worsened today and patient had difficulty climbing up 2 steps. She reports over the last 3 weeks patient seems to fall towards her right side. Patient does not endorsing dizziness at this time. Patient's daughter also feels like she is slurring her speech. She does admit to congestion and rhinorrhea. She did not receive her COVID-19 vaccination. PAST MEDICAL / SURGICAL / SOCIAL / FAMILY HISTORY      has a past medical history of [place] (hard of hearing). has a past surgical history that includes Hysterectomy.     Social History     Socioeconomic History    Marital status: Single     Spouse name: Not on file    Number of children: Not on file    Years of education: Not on file    Highest education level: Not on file   Occupational History    Not on file   Tobacco Use    Smoking status: Never Smoker    Smokeless tobacco: Not on file   Substance and Sexual Activity    Alcohol use: Never    Drug use: Never    Sexual activity: Not on file   Other Topics Concern    Not on file   Social History Narrative    Not on file     Social Determinants of Health Financial Resource Strain:     Difficulty of Paying Living Expenses: Not on file   Food Insecurity:     Worried About Running Out of Food in the Last Year: Not on file    Mark of Food in the Last Year: Not on file   Transportation Needs:     Lack of Transportation (Medical): Not on file    Lack of Transportation (Non-Medical): Not on file   Physical Activity:     Days of Exercise per Week: Not on file    Minutes of Exercise per Session: Not on file   Stress:     Feeling of Stress : Not on file   Social Connections:     Frequency of Communication with Friends and Family: Not on file    Frequency of Social Gatherings with Friends and Family: Not on file    Attends Shinto Services: Not on file    Active Member of [address] [place] [address] [id number] or Organizations: Not on file    Attends Club or Organization Meetings: Not on file    Marital Status: Not on file   Intimate Partner Violence:     Fear of Current or Ex-Partner: Not on file    Emotionally Abused: Not on file    Physically Abused: Not on file    Sexually Abused: Not on file   Housing Stability:     Unable to Pay for Housing in the Last Year: Not on file    Number of [place] in the Last Year: Not on file    Unstable Housing in the Last Year: Not on file       No family history on file. Allergies:  Patient has no known allergies. Home Medications:  Prior to Admission medications    Not on File       REVIEW OF SYSTEMS    (2-9 systems for level 4, 10 or more for level 5)      Review of Systems   Constitutional: Negative for chills, fatigue and fever. HENT: Positive for congestion and rhinorrhea. Eyes: Negative for visual disturbance. Respiratory: Negative for cough and shortness of breath. Cardiovascular: Negative for chest pain. Gastrointestinal: Negative for abdominal pain, nausea and vomiting. Musculoskeletal: Positive for gait problem. Skin: Negative for color change and rash.    Neurological: Positive for speech difficulty, weakness and numbness. Psychiatric/Behavioral: Negative for confusion. PHYSICAL EXAM   (up to 7 for level 4, 8 or more for level 5)     INITIAL VITALS:    height is 5' 8\" (1.727 m) and weight is 205 lb (93 kg). Her oral temperature is 99.3 °F (37.4 °C). Her blood pressure is 130/69 and her pulse is 83. Her respiration is 15 and oxygen saturation is 99%. Physical Exam  Constitutional:       Comments: Alert and oriented to person, place, time. Resting comfortably, no acute distress. Nontoxic in appearance. HENT:      Head: Normocephalic and atraumatic. Eyes:      Extraocular Movements: Extraocular movements intact. Pupils: Pupils are equal, round, and reactive to light. Cardiovascular:      Rate and Rhythm: Normal rate and regular rhythm. Heart sounds: No murmur heard. No gallop. Pulmonary:      Effort: Pulmonary effort is normal. No respiratory distress. Breath sounds: Normal breath sounds. No wheezing. Abdominal:      General: Abdomen is flat. Palpations: Abdomen is soft. Tenderness: There is no abdominal tenderness. There is no guarding or rebound. Musculoskeletal:      Right lower leg: No edema. Left lower leg: No edema. Skin:     General: Skin is warm and dry. Capillary Refill: Capillary refill takes less than 2 seconds. Findings: No rash.    Neurological:      Comments:  pupils equal and reactive with bilateral pupillary reflexes intact, no visual field deficits, intact extraocular motion, no facial motor deficit or droop, patient does have dysarthria per daughter, no facial sensory deficit, symmetrical palate elevation, intact tongue range of motion, good shoulder shrug and good neck range of motion, patient does have tremor present bilaterally with finger-to-nose testing which is worse on the right upper extremity, no pronator drift, good hand grasp bilaterally, 5/5 strength in the bilateral upper and lower extremity, no sensory deficit in the upper extremities, patient does have sensation of pins-and-needles in the bilateral lower extremity           DIFFERENTIAL  DIAGNOSIS     PLAN (LABS / [facility] / EKG):  Orders Placed This Encounter   Procedures    COVID-19, Rapid    CT HEAD WO CONTRAST    CTA HEAD NECK W CONTRAST    XR CHEST PORTABLE    STROKE PANEL    ELECTROLYTES PLUS    Hemoglobin and hematocrit, blood    CALCIUM, IONIC (POC)    C-Reactive Protein    D-Dimer, Quantitative    Ferritin    Fibrinogen    Lactate Dehydrogenase    Procalcitonin    HEMOGLOBIN A1C    Troponin    ADULT DIET; Regular; 4 carb choices (60 gm/meal)    Inpatient consult to Internal Medicine    Inpatient consult to Neurology    SLP clinical swallow evaluation    Venous Blood Gas, POC    Creatinine W/GFR Point of Care    POCT urea (BUN)    Lactic Acid, POC    POCT Glucose    PATIENT STATUS (FROM ED OR OR/PROCEDURAL) Inpatient       MEDICATIONS ORDERED:  Orders Placed This Encounter   Medications    dexamethasone (DECADRON) injection 6 mg    iopamidol (ISOVUE-370) 76 % injection 90 mL       DDX: CVA, hemorrhagic stroke, electrolyte abnormality, COVID-19, peripheral neuropathy, polyneuropathy, Parkinson's    Initial MDM/Plan: 66 y.o. female who presents with bilateral lower extremity numbness of 3 weeks duration which has worsened over the last week with associated falling to the right, and bilateral upper extremity tremors. Daughter is concerned as patient is been unsteady on her feet. Seen and examined, no acute distress. Patient is speaking in full sentences. She is well in appearance. Of note patient is hypoxic at 86%, placed on 3 L nasal cannula and improved to mid 90s. Vitals are otherwise unremarkable, she is afebrile. Neurological exam is remarkable for bilateral finger-nose ataxia worse on the right upper extremity. Resting tremor bilaterally. Sensory deficits in the bilateral lower extremity as well as dysarthria.   Given symptoms have been ongoing for 3 weeks and worsening today will consult stroke no indication for stroke alert as patient is outside the TPA or intervention window. We will plan for CT and CTA of the head. Given patient is hypoxic we will add on COVID-19 testing.     DIAGNOSTIC RESULTS / EMERGENCY DEPARTMENT COURSE / MDM     LABS:  Labs Reviewed   COVID-19, RAPID - Abnormal; Notable for the following components:       Result Value    SARS-CoV-2, Rapid DETECTED (*)     All other components within normal limits   STROKE PANEL - Abnormal; Notable for the following components:    Glucose 130 (*)     Total  (*)     Seg Neutrophils 77 (*)     Lymphocytes 16 (*)     Eosinophils % 0 (*)     Absolute Lymph # 0.66 (*)     Myoglobin 98 (*)     Troponin, High Sensitivity 19 (*)     All other components within normal limits   C-REACTIVE PROTEIN - Abnormal; Notable for the following components:    CRP 11.3 (*)     All other components within normal limits   FERRITIN - Abnormal; Notable for the following components:    Ferritin 298 (*)     All other components within normal limits   FIBRINOGEN - Abnormal; Notable for the following components:    Fibrinogen 435 (*)     All other components within normal limits   LACTATE DEHYDROGENASE - Abnormal; Notable for the following components:     (*)     All other components within normal limits   VENOUS BLOOD GAS, POINT OF CARE - Abnormal; Notable for the following components:    pO2, Benny 23.4 (*)     O2 Sat, Benny 38 (*)     All other components within normal limits   POCT GLUCOSE - Abnormal; Notable for the following components:    POC Glucose 133 (*)     All other components within normal limits   ELECTROLYTES PLUS   HGB/HCT   CALCIUM, IONIC (POC)   D-DIMER, QUANTITATIVE   PROCALCITONIN   TROPONIN   CREATININE W/GFR POINT OF CARE   UREA N (POC)   LACTIC ACID,POINT OF CARE         RADIOLOGY:  CT HEAD WO CONTRAST    Result Date: 12/27/2021  EXAMINATION: CT OF THE HEAD WITHOUT CONTRAST 12/27/2021 10:42 am TECHNIQUE: CT of the head was performed without the administration of intravenous contrast. Dose modulation, iterative reconstruction, and/or weight based adjustment of the mA/kV was utilized to reduce the radiation dose to as low as reasonably achievable. COMPARISON: 06/28/2021 HISTORY: ORDERING SYSTEM PROVIDED HISTORY: b/l numbness, falling to right side, dizzy, r>L tremor TECHNOLOGIST PROVIDED HISTORY: b/l numbness, falling to right side, dizzy, r>L tremor Decision Support Exception - unselect if not a suspected or confirmed emergency medical condition->Emergency Medical Condition (MA) Reason for Exam: Bi-lateral numbness, falling to right side, dizzy, L tremor FINDINGS: BRAIN/VENTRICLES: Examination is mildly degraded by artifact traversing the superior brain parenchyma. There is no acute intracranial hemorrhage, mass effect or midline shift. No abnormal extra-axial fluid collection. The gray-white differentiation appears grossly maintained without evidence of an acute infarct. There is no evidence of hydrocephalus. ORBITS: The visualized portion of the orbits demonstrate no acute abnormality. SINUSES: The visualized paranasal sinuses and mastoid air cells demonstrate chronic near complete opacification of the right sphenoid sinus with mucoperiosteal thickening with some superimposed aerated secretions. SOFT TISSUES/SKULL:  No acute abnormality of the visualized skull or soft tissues. No acute intracranial abnormality. Possible acute on chronic right sphenoid sinusitis. XR CHEST PORTABLE    Result Date: 12/27/2021  EXAMINATION: ONE XRAY VIEW OF THE CHEST 12/27/2021 10:47 am COMPARISON: 06/28/2021 HISTORY: ORDERING SYSTEM PROVIDED HISTORY: hypoxic TECHNOLOGIST PROVIDED HISTORY: hypoxic Reason for Exam: port upright FINDINGS: . The cardiac size is normal. No acute infiltrates or pleural effusions are seen.  Pulmonary vascularity appears mildly congested more notably in the mid and lower lung zones. There is mild ectasia of the thoracic aorta. There are degenerative changes in the spine . No acute bony abnormalities. The hilar structures are normal.     Mild pulmonary vascular congestion     CTA HEAD NECK W CONTRAST    Result Date: 12/27/2021  EXAMINATION: CTA OF THE HEAD AND NECK WITH CONTRAST, 12/27/2021 1:42 pm TECHNIQUE: CTA of the head and neck was performed with the administration of intravenous contrast. Multiplanar reformatted images are provided for review. MIP images are provided for review. Stenosis of the internal carotid arteries measured using NASCET criteria. Dose modulation, iterative reconstruction, and/or weight based adjustment of the mA/kV was utilized to reduce the radiation dose to as low as reasonably achievable. COMPARISON: None HISTORY: ORDERING SYSTEM PROVIDED HISTORY: [place] numbness, falling to right side, dizzy, r>L tremor TECHNOLOGIST PROVIDED HISTORY: [place] numbness, falling to right side, dizzy, r>L tremor Decision Support Exception - unselect if not a suspected or confirmed emergency medical condition->Emergency Medical Condition (MA) Reason for Exam: Bilateral numbness, falling to right side, dizzy, L tremor. FINDINGS: CTA NECK: AORTIC ARCH/ARCH VESSELS: No dissection or arterial injury. No significant stenosis of the brachiocephalic or subclavian arteries. CAROTID ARTERIES: No dissection, arterial injury, or hemodynamically significant stenosis by NASCET criteria. VERTEBRAL ARTERIES: Moderate stenosis of the right vertebral artery origin due to calcified plaque. Left vertebral artery is patent. SOFT TISSUES: Two small areas of nonspecific ground-glass opacity in the periphery of the upper lobes of the lungs bilaterally. No neck mass is identified. BONES: There is chronic right sphenoid sinusitis. There is advanced dental disease with caries and periapical lucencies. 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   No acute osseous abnormality. CTA HEAD: ANTERIOR CIRCULATION: There is mild stenosis of the left middle cerebral artery M1 segment. The more distal branches are patent. The right middle cerebral artery is patent. Both anterior cerebral artery patent. POSTERIOR CIRCULATION: No significant stenosis of the vertebral, basilar, or posterior cerebral arteries. No aneurysm. OTHER: No dural venous sinus thrombosis on this non-dedicated study. BRAIN: See noncontrast head CT. No large vessel occlusion in the head or neck. EMERGENCY DEPARTMENT COURSE:  ED Course as of 12/27/21 1554   Mon Dec 27, 2021   159 73-year-old female with no past medical history presents with her daughter for complaints of numbness, ataxia. Patient's daughter reports that over the last 3 weeks patient has had bilateral lower extremity numbness. Throughout this timeframe she has had progressively worsening ambulation. Daughter became concerned as it acutely worsened today and patient had difficulty climbing up 2 steps. She reports over the last 3 weeks patient seems to fall towards her right side. Patient does not endorsing dizziness at this time. Patient's daughter also feels like she is slurring her speech. She does admit to congestion and rhinorrhea. She did not receive her COVID-19 vaccination. [DS]      ED Course User Index  [DS] Slime Cai, DO     Given hypoxia COVID-19 test was added on. Patient did test positive for COVID-19. Please simply use nasal cannula and had improvement in oxygen saturations. Inflammatory markers were added on.  CT and CTA are without acute abnormality. Stroke team was consulted, plan for MRI. Given COVID-19 with hypoxia internal medicine was paged for admission, they accepted admission of the patient.     PROCEDURES:  None    CONSULTS:  IP CONSULT TO INTERNAL MEDICINE  IP CONSULT TO NEUROLOGY  IP CONSULT TO INFECTIOUS DISEASES  IP CONSULT TO CASE MANAGEMENT    CRITICAL CARE:  Please see attending note    FINAL IMPRESSION      1. COVID-19          DISPOSITION / PLAN     DISPOSITION Admitted 12/27/2021 03:10:11 PM        PATIENTREFERRED TO:  No follow-up provider specified.     DISCHARGE MEDICATIONS:  New Prescriptions    No medications on file       Gabriella Sargent DO  EmergencyMedicine Resident    (Please note that portions of this note were completed with a voice recognition program.  Efforts were made to edit the dictations but occasionally words are mis-transcribed.)        Gabriella Sargent DO  Resident  12/27/21 1225

## 2021-12-27 NOTE — CONSULTS
Department of Neurology                                          Resident Consult Note                                                       ED bed: 30  Reason for Consult:  Generalized weakness, tremor  Requesting Physician:  Dr. Maurice Garrido DO    History Obtained From:  patient, electronic medical record    CHIEF COMPLAINT:       Generalized weakness, tremor    HISTORY OF PRESENT ILLNESS:       The patient is a 66 y.o. female who presents with generalized weakness, some concern for gait instability, tremors. Patient does not take medications for anything, says in the past she has had occasional tremors associated with anxiety, had been on a medication that she did not remember the name of many years ago that did help. More recently she says her sister passed away few months ago and says that she has been extremity stress and upset and when she gets very anxious her tremors come on more. never get to the point where she is dropping things or spilling liquids or having trouble writing. In addition, over the last couple weeks you suddenly feel generalized fatigue, her felt she was not completely right in told her to come to the ED to be evaluated. Her Covid test came back positive, she was requiring nasal cannula oxygen.     NIH Stroke Scale Total 0    Modified Tesfaye Score Scale:     [x] Zero: No symptoms at all   [] 1: No significant disability despite symptoms; able to carry out all usual duties and activities   [] 2: Slight disability; unable to carry out all previous activities, but able to look after own affairs without assistance   [] 3:Moderate disability; requiring some help, but able to walk without assistance   [] 4: Moderately severe disability; unable to walk and attend to bodily needs without assistance   [] 5:Severe disability; bedridden, incontinent and requiring constant nursing care and attention         PAST MEDICAL HISTORY :       Past Medical History: Right:  downgoing  Left:  downgoing    Clonus:  absent      Coordination/Dysmetria:  Heel to Shin:  Right:  normal  Left:  normal  Finger to Nose:   Right:  normal  Left:  normal   Dysdiadochokinesia:  absent     SKIN:  no rash      LABS AND IMAGING:     CBC with Differential:    Lab Results   Component Value Date    WBC 4.1 12/27/2021    RBC 4.45 12/27/2021    HGB 12.3 12/27/2021    HCT 38.7 12/27/2021     12/27/2021    MCV 87.0 12/27/2021    MCH 27.6 12/27/2021    MCHC 31.8 12/27/2021    RDW 13.4 12/27/2021    LYMPHOPCT 16 12/27/2021    MONOPCT 7 12/27/2021    BASOPCT 0 12/27/2021    MONOSABS 0.27 12/27/2021    LYMPHSABS 0.66 12/27/2021    EOSABS <0.03 12/27/2021    BASOSABS <0.03 12/27/2021    DIFFTYPE NOT REPORTED 12/27/2021     BMP:    Lab Results   Component Value Date     12/27/2021    K 3.9 12/27/2021     12/27/2021    CO2 21 12/27/2021    BUN 16 12/27/2021    LABALBU 4.3 07/25/2019    CREATININE 0.68 12/27/2021    CREATININE 0.76 12/27/2021    CALCIUM 8.9 12/27/2021    GFRAA >60 12/27/2021    LABGLOM >60 12/27/2021    GLUCOSE 130 12/27/2021       Radiology Review:  CT HEAD WO CONTRAST    Result Date: 12/27/2021  EXAMINATION: CT OF THE HEAD WITHOUT CONTRAST  12/27/2021 10:42 am TECHNIQUE: CT of the head was performed without the administration of intravenous contrast. Dose modulation, iterative reconstruction, and/or weight based adjustment of the mA/kV was utilized to reduce the radiation dose to as low as reasonably achievable.  COMPARISON: 06/28/2021 HISTORY: ORDERING SYSTEM PROVIDED HISTORY: b/l numbness, falling to right side, dizzy, r>L tremor TECHNOLOGIST PROVIDED HISTORY: b/l numbness, falling to right side, dizzy, r>L tremor Decision Support Exception - unselect if not a suspected or confirmed emergency medical condition->Emergency Medical Condition (MA) Reason for Exam: Bi-lateral numbness, falling to right side, dizzy, L tremor FINDINGS: BRAIN/VENTRICLES: Examination is mildly degraded by artifact traversing the superior brain parenchyma. There is no acute intracranial hemorrhage, mass effect or midline shift. No abnormal extra-axial fluid collection. The gray-white differentiation appears grossly maintained without evidence of an acute infarct. There is no evidence of hydrocephalus. ORBITS: The visualized portion of the orbits demonstrate no acute abnormality. SINUSES: The visualized paranasal sinuses and mastoid air cells demonstrate chronic near complete opacification of the right sphenoid sinus with mucoperiosteal thickening with some superimposed aerated secretions. SOFT TISSUES/SKULL:  No acute abnormality of the visualized skull or soft tissues. No acute intracranial abnormality. Possible acute on chronic right sphenoid sinusitis. XR CHEST PORTABLE    Result Date: 12/27/2021  EXAMINATION: ONE XRAY VIEW OF THE CHEST 12/27/2021 10:47 am COMPARISON: 06/28/2021 HISTORY: ORDERING SYSTEM PROVIDED HISTORY: hypoxic TECHNOLOGIST PROVIDED HISTORY: hypoxic Reason for Exam: port upright FINDINGS: . The cardiac size is normal. No acute infiltrates or pleural effusions are seen. Pulmonary vascularity appears mildly congested more notably in the mid and lower lung zones. There is mild ectasia of the thoracic aorta. There are degenerative changes in the spine . No acute bony abnormalities. The hilar structures are normal.     Mild pulmonary vascular congestion     CTA HEAD NECK W CONTRAST    Result Date: 12/27/2021  EXAMINATION: CTA OF THE HEAD AND NECK WITH CONTRAST, 12/27/2021 1:42 pm TECHNIQUE: CTA of the head and neck was performed with the administration of intravenous contrast. Multiplanar reformatted images are provided for review. MIP images are provided for review. Stenosis of the internal carotid arteries measured using NASCET criteria.  Dose modulation, iterative reconstruction, and/or weight based adjustment of the mA/kV was utilized to reduce the radiation dose to as low as reasonably achievable. COMPARISON: None HISTORY: ORDERING SYSTEM PROVIDED HISTORY: Leroy numbness, falling to right side, dizzy, r>L tremor TECHNOLOGIST PROVIDED HISTORY: Leroy numbness, falling to right side, dizzy, r>L tremor Decision Support Exception - unselect if not a suspected or confirmed emergency medical condition->Emergency Medical Condition (MA) Reason for Exam: Bilateral numbness, falling to right side, dizzy, L tremor. FINDINGS: CTA NECK: AORTIC ARCH/ARCH VESSELS: No dissection or arterial injury. No significant stenosis of the brachiocephalic or subclavian arteries. CAROTID ARTERIES: No dissection, arterial injury, or hemodynamically significant stenosis by NASCET criteria. VERTEBRAL ARTERIES: Moderate stenosis of the right vertebral artery origin due to calcified plaque. Left vertebral artery is patent. SOFT TISSUES: Two small areas of nonspecific ground-glass opacity in the periphery of the upper lobes of the lungs bilaterally. No neck mass is identified. BONES: There is chronic right sphenoid sinusitis. There is advanced dental disease with caries and periapical lucencies. 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 No acute osseous abnormality. CTA HEAD: ANTERIOR CIRCULATION: There is mild stenosis of the left middle cerebral artery M1 segment. The more distal branches are patent. The right middle cerebral artery is patent. Both anterior cerebral artery patent. POSTERIOR CIRCULATION: No significant stenosis of the vertebral, basilar, or posterior cerebral arteries. No aneurysm. OTHER: No dural venous sinus thrombosis on this non-dedicated study. BRAIN: See noncontrast head CT. No large vessel occlusion in the head or neck. ASSESSMENT AND PLAN:       Patient Active Problem List   Diagnosis    COVID-19       66 y.o. female who presents with generalized weakness, has Covid pneumonia, requiring oxygen.   No focal weakness or numbness or any neurological deficit concerning for acute or subacute stroke. CT and CTA unremarkable. No further test indicated at this time. Continue full support for Covid. Patient has a mild physiologic tremor with some intention component, her physiological tremor is enhanced with anxiety. She is currently not experiencing  any disabling symptoms and does not want to be necessarily started on any tremor medication. She can follow-up with neurology outpatient and can be offered medications if tremors become problematic.     - Telemetry    - Neuro checks per protocol  - We recommend SBP normotensive  - Blood glucose goal less than 180  - Please avoid dextrose containing solutions    No further neurological recommendations at this time, will sign off. Please call us for any questions.       Marjorie Michael MD   12/27/2021  3:19 PM

## 2021-12-27 NOTE — ED NOTES
Pt to ED via triage a/o x4 with c/o numbness and tingling to bilateral legs. Per family pt has been c/o for 3 weeks. Pt denies any chest pain or SOB. Pt is 86% on RA but denies any medical problems or home meds. Pt denies any recent sick contact. Family is also c/o pt falling to the right side and having a tremor in bilateral hands. Pt VSS, will continue to monitor.       Kip Quinones RN  12/27/21 2244

## 2021-12-27 NOTE — ED PROVIDER NOTES
[facility]  Emergency Department  Faculty Attestation     I performed a history and physical examination of the patient and discussed management with the resident. I reviewed the residents note and agree with the documented findings and plan of care. Any areas of disagreement are noted on the chart. I was personally present for the key portions of any procedures. I have documented in the chart those procedures where I was not present during the key portions. I have reviewed the emergency nurses triage note. I agree with the chief complaint, past medical history, past surgical history, allergies, medications, social and family history as documented unless otherwise noted below. For Physician Assistant/ Nurse Practitioner cases/documentation I have personally evaluated this patient and have completed at least one if not all key elements of the E/M (history, physical exam, and MDM). Additional findings are as noted. Primary Care Physician:  No primary care provider on file. Screenings:  [unfilled]    CHIEF COMPLAINT       Chief Complaint   Patient presents with    Numbness     bilateral legs     Gait Problem     falling to the right side        RECENT VITALS:   Temp: 99.3 °F (37.4 °C),  Pulse: 88, Resp: 20, BP: 104/60    LABS:  Labs Reviewed   COVID-19, RAPID   STROKE PANEL       Radiology  CT HEAD WO CONTRAST    (Results Pending)   CTA HEAD NECK W CONTRAST    (Results Pending)       CRITICAL CARE: There was a high probability of clinically significant/life threatening deterioration in this patient's condition which required my urgent intervention. Total critical care time was 5 minutes. This excludes any time for separately reportable procedures.      EKG:      Attending Physician Additional  Notes    Patient had several weeks of symptoms worse since yesterday with shakiness of the right arm and leg, difficulty ambulating, leaning to the right side, feeling of falling. There is no headache or dizziness/vertigo. No trauma. No chest pain shortness of breath. No back pain. No increased thirst.  Concern for dehydration. No vomiting or diarrhea. No true weakness. Daughter is concerned that she is at risk for falling, and lives alone. Patient sister  in May of this year and since then she has been depressed and not eating as much. No new medications. On exam she is GCS 15, low-grade temperature, borderline blood pressure. Skin is warm and dry no obvious rash. There is mild pallor noted. Normal pupils and extraocular meds no nystagmus noted. Gaze is conjugate. Face is symmetrical.  Motor strength is intact without drift. She has tremor, intentional nature and abnormal finger-nose movements just on the right side. No rigidity. Lungs are clear. Abdomen is benign. No obvious edema. Impression is gait disturbance, new tremor, weakness, consider stroke, rule out other issues such as dehydration or metabolic disturbance. Plan is EKG, labs, fluids, CT brain, CT angiogram head/neck, stroke team consultation, anticipate admission for PT/OT. Yuliet Phillips.  Greg Hernandez MD, [facility]  Attending Emergency  Physician               Janak Pak MD  [phone]

## 2021-12-27 NOTE — FLOWSHEET NOTE
Chaplains remain available for spiritual or emotional support as needed.       12/27/21 1405   Encounter Summary   Services provided to: Patient   Referral/Consult From: Multi-disciplinary team   Support System Unknown   Continue Visiting   (12/27/2021)   Complexity of Encounter Moderate   Length of Encounter 15 minutes   Crisis   Type Stroke Alert   Assessment Unable to respond   Intervention Sustaining presence/ Ministry of presence;Prayer   Outcome Did not respond

## 2021-12-27 NOTE — CONSULTS
[facility] -   Infectious diseases evaluation  admission date 12/27/2021    reason for consultation:   covid pneumonia     Impression :   Current:  · covid illness - presumed pneumonia  w hypoxemia  · Elevated fibrinogen    Other:  ·   Discussion / summary of stay / plan of care   ·   Recommendations   · CT chest - covid + pneumonia   · Decadron 6 mg daily x10  · anticoag  · actemra x 1 12/27  · Get UA    Infection Control Recommendations   · [place] Precautions  · Contact Isolation   · Airborne isolation  · Droplet Isolation  ·     Antimicrobial Stewardship Recommendations   · Off AB    Coordination ofOutpatient Care:   · Estimated Length of IV antimicrobials:  · Patient will need Midline / picc Catheter Insertion:   · Patient will need SNF:  · Patient will need outpatient wound care:     History of Present Illness:   Initial history:  Terressa Duverney is a 66y.o.-year-old female x 2 weeks gait issues and fatigue, and came to ER also for a severe tremor increasing,   Very tired at exertion biut did not notice SOB and no cough  She tested + covid and not vaccinated  Saturated in ER 86 on RA  CXr congested and no clear covid picture    Passed swallow study bedside      Interval changes  12/27/2021   Patient Vitals for the past 8 hrs:   BP Temp Temp src Pulse Resp SpO2 Height Weight   12/27/21 1801 132/70 -- -- 79 28 93 % -- --   12/27/21 1718 (!) 130/59 -- -- 73 -- 94 % -- --   12/27/21 1447 130/69 -- -- 83 15 99 % -- --   12/27/21 1431 130/74 -- -- -- -- 96 % -- --   12/27/21 1301 126/64 -- -- 79 23 95 % -- --   12/27/21 1245 123/66 -- -- 85 26 94 % -- --   12/27/21 1241 104/60 99.3 °F (37.4 °C) Oral 88 20 93 % 5' 8\" (1.727 m) 205 lb (93 kg)       Summary of relevant labs:  Labs:  WBC4.1   Kiosunfe553 High    Dimer, Quant0.47   Ufozyepvhn988 High    Procalcitonin0.05    High    CRP11.3 High      Micro:  SARS-CoV-2, RapidDETECTED Abnormal Imaging:  CXR  Mild pulmonary vascular congestion     CT head  No acute intracranial abnormality. Possible acute on chronic right sphenoid sinusitis. I have personally reviewed the past medical history, past surgical history, medications, social history, and family history, and I haveupdated the database accordingly. Allergies:   Patient has no known allergies. Review of Systems:     Review of Systems   Constitutional: Positive for activity change. HENT: Negative for congestion. Eyes: Negative for redness. Respiratory: Negative for apnea, cough and shortness of breath. Cardiovascular: Negative for chest pain. Gastrointestinal: Negative for abdominal distention. Endocrine: Negative for polyphagia. Genitourinary: Negative for dysuria. Musculoskeletal: Negative for arthralgias. Skin: Negative for color change. Neurological: Positive for tremors, weakness and numbness. Psychiatric/Behavioral: Negative for agitation. Physical Examination :       Physical Exam  Constitutional:       Appearance: She is not ill-appearing. HENT:      Head: Normocephalic and atraumatic. Nose: Nose normal.      Mouth/Throat:      Mouth: Mucous membranes are moist.   Eyes:      General: No scleral icterus. Conjunctiva/sclera: Conjunctivae normal.   Cardiovascular:      Rate and Rhythm: Normal rate and regular rhythm. Heart sounds: Normal heart sounds. No murmur heard. Pulmonary:      Effort: No respiratory distress. Breath sounds: Normal breath sounds. Abdominal:      General: There is no distension. Palpations: Abdomen is soft. There is no mass. Genitourinary:     Comments: Urine dimitry  Musculoskeletal:         General: No swelling or deformity. Cervical back: Neck supple. No rigidity. Skin:     General: Skin is dry. Coloration: Skin is not jaundiced. Neurological:      General: No focal deficit present.       Mental Status: She is alert and oriented to person, place, and time. Psychiatric:         Mood and Affect: Mood normal.         Thought Content: Thought content normal.         Past Medical History:     Past Medical History:   Diagnosis Date    [place] (hard of hearing)     on 7/25/19 pt states she left her hearing aides at home       Past Surgical  History:     Past Surgical History:   Procedure Laterality Date    HYSTERECTOMY         Medications:       Social History:     Social History     Socioeconomic History    Marital status: Single     Spouse name: Not on file    Number of children: Not on file    Years of education: Not on file    Highest education level: Not on file   Occupational History    Not on file   Tobacco Use    Smoking status: Never Smoker    Smokeless tobacco: Not on file   Substance and Sexual Activity    Alcohol use: Never    Drug use: Never    Sexual activity: Not on file   Other Topics Concern    Not on file   Social History Narrative    Not on file     Social Determinants of Health     Financial Resource Strain:     Difficulty of Paying Living Expenses: Not on file   Food Insecurity:     Worried About [address] in the Last Year: Not on file    [address] in the Last Year: Not on file   Transportation Needs:     Lack of Transportation (Medical): Not on file    Lack of Transportation (Non-Medical):  Not on file   Physical Activity:     Days of Exercise per Week: Not on file    Minutes of Exercise per Session: Not on file   Stress:     Feeling of Stress : Not on file   Social Connections:     Frequency of Communication with Friends and Family: Not on file    Frequency of Social Gatherings with Friends and Family: Not on file    Attends Orthodoxy Services: Not on file    Active Member of Clubs or Organizations: Not on file    Attends Club or Organization Meetings: Not on file    Marital Status: Not on file   Intimate Partner Violence:     Fear of Current or Ex-Partner: Not on file   [facility] Emotionally Abused: Not on file    Physically Abused: Not on file    Sexually Abused: Not on file   Housing Stability:     Unable to Pay for Housing in the Last Year: Not on file    Number of Places Lived in the Last Year: Not on file    Unstable Housing in the Last Year: Not on file       Family History:   No family history on file. Medical Decision Making:   I have independently reviewed/ordered the following labs:    CBC with Differential:   Recent Labs     12/27/21  1248   WBC 4.1   HGB 12.3   HCT 38.7      LYMPHOPCT 16*   MONOPCT 7     BMP:  Recent Labs     12/27/21  1237 12/27/21  1248   NA  --  137   K  --  3.9   CL  --  103   CO2  --  21   BUN  --  16   CREATININE 0.76 0.68     Hepatic Function Panel: No results for input(s): PROT, LABALBU, BILIDIR, IBILI, BILITOT, ALKPHOS, ALT, AST in the last 72 hours. No results for input(s): RPR in the last 72 hours. No results for input(s): HIV in the last 72 hours. No results for input(s): BC in the last 72 hours. Lab Results   Component Value Date    CREATININE 0.68 12/27/2021    CREATININE 0.76 12/27/2021    GLUCOSE 130 12/27/2021       Detailed results: Thank you for allowing us to participate in the care of this patient. Please call with questions. This note is created with the assistance of a speech recognition program.  While intending to generate adocument that actually reflects the content of the visit, the document can still have some errors including those of syntax and sound a like substitutions which may escape proof reading. It such instances, actual meaningcan be extrapolated by contextual diversion.     Keith Topete MD  Office: [phone]  Perfect serve / office [phone]

## 2021-12-28 VITALS
TEMPERATURE: 97.9 F | DIASTOLIC BLOOD PRESSURE: 71 MMHG | RESPIRATION RATE: 16 BRPM | HEART RATE: 87 BPM | SYSTOLIC BLOOD PRESSURE: 117 MMHG | OXYGEN SATURATION: 98 % | BODY MASS INDEX: 31.07 KG/M2 | WEIGHT: 205 LBS | HEIGHT: 68 IN

## 2021-12-28 PROBLEM — N39.0 UTI (URINARY TRACT INFECTION): Status: ACTIVE | Noted: 2021-12-28

## 2021-12-28 LAB
-: ABNORMAL
AMORPHOUS: ABNORMAL
BACTERIA: ABNORMAL
BILIRUBIN URINE: NEGATIVE
CASTS UA: ABNORMAL /LPF (ref 0–8)
COLOR: YELLOW
COMMENT UA: ABNORMAL
CRYSTALS, UA: ABNORMAL /HPF
EPITHELIAL CELLS UA: ABNORMAL /HPF (ref 0–5)
ESTIMATED AVERAGE GLUCOSE: 137 MG/DL
GLUCOSE BLD-MCNC: 108 MG/DL (ref 65–105)
GLUCOSE BLD-MCNC: 115 MG/DL (ref 65–105)
GLUCOSE URINE: NEGATIVE
HBA1C MFR BLD: 6.4 % (ref 4–6)
KETONES, URINE: ABNORMAL
LEUKOCYTE ESTERASE, URINE: ABNORMAL
MUCUS: ABNORMAL
NITRITE, URINE: POSITIVE
OTHER OBSERVATIONS UA: ABNORMAL
PH UA: 5.5 (ref 5–8)
PROTEIN UA: ABNORMAL
RBC UA: ABNORMAL /HPF (ref 0–4)
RENAL EPITHELIAL, UA: ABNORMAL /HPF
SPECIFIC GRAVITY UA: 1.03 (ref 1–1.03)
TRICHOMONAS: ABNORMAL
TURBIDITY: ABNORMAL
URINE HGB: NEGATIVE
UROBILINOGEN, URINE: NORMAL
WBC UA: ABNORMAL /HPF (ref 0–5)
YEAST: ABNORMAL

## 2021-12-28 PROCEDURE — 6370000000 HC RX 637 (ALT 250 FOR IP): Performed by: STUDENT IN AN ORGANIZED HEALTH CARE EDUCATION/TRAINING PROGRAM

## 2021-12-28 PROCEDURE — 82947 ASSAY GLUCOSE BLOOD QUANT: CPT

## 2021-12-28 PROCEDURE — 93970 EXTREMITY STUDY: CPT

## 2021-12-28 PROCEDURE — 81001 URINALYSIS AUTO W/SCOPE: CPT

## 2021-12-28 PROCEDURE — 99232 SBSQ HOSP IP/OBS MODERATE 35: CPT | Performed by: INTERNAL MEDICINE

## 2021-12-28 PROCEDURE — 2580000003 HC RX 258: Performed by: INTERNAL MEDICINE

## 2021-12-28 PROCEDURE — 6360000002 HC RX W HCPCS: Performed by: STUDENT IN AN ORGANIZED HEALTH CARE EDUCATION/TRAINING PROGRAM

## 2021-12-28 PROCEDURE — 87186 SC STD MICRODIL/AGAR DIL: CPT

## 2021-12-28 PROCEDURE — 92610 EVALUATE SWALLOWING FUNCTION: CPT

## 2021-12-28 PROCEDURE — 83036 HEMOGLOBIN GLYCOSYLATED A1C: CPT

## 2021-12-28 PROCEDURE — 87077 CULTURE AEROBIC IDENTIFY: CPT

## 2021-12-28 PROCEDURE — 2580000003 HC RX 258: Performed by: STUDENT IN AN ORGANIZED HEALTH CARE EDUCATION/TRAINING PROGRAM

## 2021-12-28 PROCEDURE — 36415 COLL VENOUS BLD VENIPUNCTURE: CPT

## 2021-12-28 PROCEDURE — 6360000002 HC RX W HCPCS: Performed by: INTERNAL MEDICINE

## 2021-12-28 PROCEDURE — 87086 URINE CULTURE/COLONY COUNT: CPT

## 2021-12-28 RX ORDER — DEXAMETHASONE 6 MG/1
6 TABLET ORAL EVERY 6 HOURS
Qty: 36 TABLET | Refills: 0 | Status: SHIPPED | OUTPATIENT
Start: 2021-12-28 | End: 2022-01-06

## 2021-12-28 RX ORDER — FLUTICASONE PROPIONATE 50 MCG
1 SPRAY, SUSPENSION (ML) NASAL DAILY
Status: DISCONTINUED | OUTPATIENT
Start: 2021-12-28 | End: 2021-12-28 | Stop reason: HOSPADM

## 2021-12-28 RX ORDER — POLYETHYLENE GLYCOL 3350 17 G/17G
17 POWDER, FOR SOLUTION ORAL DAILY PRN
Status: DISCONTINUED | OUTPATIENT
Start: 2021-12-28 | End: 2021-12-28 | Stop reason: HOSPADM

## 2021-12-28 RX ORDER — ACETAMINOPHEN 325 MG/1
650 TABLET ORAL EVERY 6 HOURS PRN
Status: DISCONTINUED | OUTPATIENT
Start: 2021-12-28 | End: 2021-12-28 | Stop reason: HOSPADM

## 2021-12-28 RX ORDER — NICOTINE POLACRILEX 4 MG
15 LOZENGE BUCCAL PRN
Status: DISCONTINUED | OUTPATIENT
Start: 2021-12-28 | End: 2021-12-28 | Stop reason: HOSPADM

## 2021-12-28 RX ORDER — FAMOTIDINE 20 MG/1
20 TABLET, FILM COATED ORAL 2 TIMES DAILY
Status: DISCONTINUED | OUTPATIENT
Start: 2021-12-28 | End: 2021-12-28 | Stop reason: HOSPADM

## 2021-12-28 RX ORDER — ACETAMINOPHEN 650 MG/1
650 SUPPOSITORY RECTAL EVERY 6 HOURS PRN
Status: DISCONTINUED | OUTPATIENT
Start: 2021-12-28 | End: 2021-12-28 | Stop reason: HOSPADM

## 2021-12-28 RX ORDER — ONDANSETRON 4 MG/1
4 TABLET, ORALLY DISINTEGRATING ORAL EVERY 8 HOURS PRN
Status: DISCONTINUED | OUTPATIENT
Start: 2021-12-28 | End: 2021-12-28 | Stop reason: HOSPADM

## 2021-12-28 RX ORDER — DEXTROSE MONOHYDRATE 25 G/50ML
12.5 INJECTION, SOLUTION INTRAVENOUS PRN
Status: DISCONTINUED | OUTPATIENT
Start: 2021-12-28 | End: 2021-12-28 | Stop reason: HOSPADM

## 2021-12-28 RX ORDER — SODIUM CHLORIDE 0.9 % (FLUSH) 0.9 %
5-40 SYRINGE (ML) INJECTION PRN
Status: DISCONTINUED | OUTPATIENT
Start: 2021-12-28 | End: 2021-12-28 | Stop reason: HOSPADM

## 2021-12-28 RX ORDER — SODIUM CHLORIDE 0.9 % (FLUSH) 0.9 %
5-40 SYRINGE (ML) INJECTION EVERY 12 HOURS SCHEDULED
Status: DISCONTINUED | OUTPATIENT
Start: 2021-12-28 | End: 2021-12-28 | Stop reason: HOSPADM

## 2021-12-28 RX ORDER — FAMOTIDINE 20 MG/1
20 TABLET, FILM COATED ORAL 2 TIMES DAILY
Qty: 60 TABLET | Refills: 3 | Status: SHIPPED | OUTPATIENT
Start: 2021-12-28

## 2021-12-28 RX ORDER — SODIUM CHLORIDE 9 MG/ML
25 INJECTION, SOLUTION INTRAVENOUS PRN
Status: DISCONTINUED | OUTPATIENT
Start: 2021-12-28 | End: 2021-12-28 | Stop reason: HOSPADM

## 2021-12-28 RX ORDER — DEXTROSE MONOHYDRATE 50 MG/ML
100 INJECTION, SOLUTION INTRAVENOUS PRN
Status: DISCONTINUED | OUTPATIENT
Start: 2021-12-28 | End: 2021-12-28 | Stop reason: HOSPADM

## 2021-12-28 RX ORDER — ONDANSETRON 2 MG/ML
4 INJECTION INTRAMUSCULAR; INTRAVENOUS EVERY 6 HOURS PRN
Status: DISCONTINUED | OUTPATIENT
Start: 2021-12-28 | End: 2021-12-28 | Stop reason: HOSPADM

## 2021-12-28 RX ORDER — DEXAMETHASONE SODIUM PHOSPHATE 10 MG/ML
6 INJECTION INTRAMUSCULAR; INTRAVENOUS EVERY 24 HOURS
Status: DISCONTINUED | OUTPATIENT
Start: 2021-12-28 | End: 2021-12-28

## 2021-12-28 RX ADMIN — ENOXAPARIN SODIUM 30 MG: 30 INJECTION SUBCUTANEOUS at 01:08

## 2021-12-28 RX ADMIN — ENOXAPARIN SODIUM 30 MG: 30 INJECTION SUBCUTANEOUS at 09:02

## 2021-12-28 RX ADMIN — DEXAMETHASONE SODIUM PHOSPHATE 6 MG: 10 INJECTION INTRAMUSCULAR; INTRAVENOUS at 15:28

## 2021-12-28 RX ADMIN — CEFTRIAXONE SODIUM 1000 MG: 1 INJECTION, POWDER, FOR SOLUTION INTRAMUSCULAR; INTRAVENOUS at 09:03

## 2021-12-28 RX ADMIN — FAMOTIDINE 20 MG: 20 TABLET, FILM COATED ORAL at 09:02

## 2021-12-28 RX ADMIN — FLUTICASONE PROPIONATE 1 SPRAY: 50 SPRAY, METERED NASAL at 09:02

## 2021-12-28 RX ADMIN — TOCILIZUMAB 810 MG: 180 INJECTION, SOLUTION SUBCUTANEOUS at 01:08

## 2021-12-28 ASSESSMENT — PAIN SCALES - GENERAL: PAINLEVEL_OUTOF10: 0

## 2021-12-28 NOTE — CARE COORDINATION
Case Management Initial Discharge Plan  [place]             Spoke with:patient on phone to discuss discharge plans. Information verified: address, contacts, phone number, , insurance Yes  Insurance Provider: Medicare    Emergency Contact/Next of Kin name & number: Meet Mccray, [phone]  Who are involved in patient's support system? Tariq Frank  PCP: No primary care provider on file. Date of last visit: Unknown      Discharge Planning    Living Arrangements:  Alone     Home has 2 stories  0 stairs to climb to get into front door, elevator used, 0 stairs to climb to reach second floor  Location of bedroom/bathroom in home 2nd floor apartment  Patient able to perform ADL's:Independent    Current Services (outpatient & in home) None  DME equipment: NA  DME provider: NA    Is patient receiving oral anticoagulation therapy? No        Potential Assistance Needed:       Patient agreeable to home care: No  [place] of choice provided:  n/a    Prior SNF/Rehab Placement and Facility: None  Agreeable to SNF/Rehab: No  [place] of choice provided: n/a     Evaluation: n/a    Expected Discharge date:  21    Patient expects to be discharged to: If home: is the family and/or caregiver wiling & able to provide support at home? Yes  Who will be providing this support? Tariq Hayden*    Follow Up Appointment: Best Day/ Time:      Transportation provider: Son or friend  Transportation arrangements needed for discharge: No    Readmission Risk              Risk of Unplanned Readmission:  10             Does patient have a readmission risk score greater than 14?: No  If yes, follow-up appointment must be made within 7 days of discharge.      Goals of Care: Breathe easier      Educated Patient on transitional options, provided freedom of choice and are agreeable with plan      Discharge Plan: Home independently          Electronically signed by Clement Jonas on 21 at 12:22 PM EST

## 2021-12-28 NOTE — PROGRESS NOTES
[facility]  Internal Medicine Teaching Residency Program  Inpatient Daily Progress Note  ______________________________________________________________________________    Patient: Yeni Gallegos  YOB: 1950   TAF:[id number]    Acct: [de-identified]     Room: 2005/2005-01  Admit date: 12/27/2021  Today's date: 12/28/21  Number of days in the hospital: 1    SUBJECTIVE   Admitting Diagnosis: COVID-19  CC: Bilateral lower extremity numbness and ataxia    - Pt examined at bedside. Chart & results reviewed. - No acute events overnight  - Patient has been up to use the restroom with assistance and per the nurse is unsteady on her feet  - Patient states bilateral lower extremity numbness has resolved  - Patient requiring 4 L of O2 to maintain saturation  - Afebrile  - Vital signs stable    Plan for today:  - Continue to monitor  - Follow-up on bilateral lower extremity venous Dopplers  - Follow-up on urine analysis and culture  - Follow-up on CT of the chest  - Follow-up on neurology recommendations  - Follow-up on infectious disease recommendations      ROS:  Constitutional:  negative for chills, fevers, sweats  Respiratory:  negative for cough, dyspnea on exertion, hemoptysis, shortness of breath, wheezing  Cardiovascular:  negative for chest pain, chest pressure/discomfort, lower extremity edema, palpitations  Gastrointestinal:  negative for abdominal pain, constipation, diarrhea, nausea, vomiting  Neurological:  negative for dizziness, headache    BRIEF HISTORY     The patient is a pleasant 66 y.o. female with a chief complaint of numbness and falling to the right. Pt was brought in by her daughter due to reports of symptoms lasting over three weeks. Her ambulation has progressively gotten worse and pt started leaning to the right over those three weeks. Pt had a sudden worsening with ambulation today that prompted the daughter to bring the pt to the ED.  Today she had difficulty climbing the stairs in which she could previously do without any issues. Pt also complains of B/L numbness in the lower extremities up to the shin. Daughter stated to the ED resident that she noticed her mother speech was slurred. Neurology was consulted and stroke work up initiated. She denies any chest pain, shortness of breath, headache. Pt was found to be COVID-19 positive. Pt is not vaccinated against COVID-19. OBJECTIVE     Vital Signs:  /64   Pulse 71   Temp 97.9 °F (36.6 °C) (Oral)   Resp 18   Ht 5' 8\" (1.727 m)   Wt 205 lb (93 kg)   SpO2 98%   BMI 31.17 kg/m²     Temp (24hrs), Av.4 °F (36.9 °C), Min:97.9 °F (36.6 °C), Max:99.3 °F (37.4 °C)    No intake/output data recorded. Physical Exam:  Physical Exam  Vitals and nursing note reviewed. Constitutional:       Appearance: Normal appearance. HENT:      Head: Normocephalic and atraumatic. Nose: Nose normal.      Mouth/Throat:      Mouth: Mucous membranes are moist.      Pharynx: Oropharynx is clear. Eyes:      Extraocular Movements: Extraocular movements intact. Conjunctiva/sclera: Conjunctivae normal.      Pupils: Pupils are equal, round, and reactive to light. Cardiovascular:      Rate and Rhythm: Normal rate and regular rhythm. Pulses: Normal pulses. Heart sounds: Normal heart sounds. No murmur heard. No friction rub. No gallop. Pulmonary:      Effort: Pulmonary effort is normal. No respiratory distress. Breath sounds: Normal breath sounds. No stridor. No wheezing, rhonchi or rales. Chest:      Chest wall: No tenderness. Abdominal:      General: Abdomen is flat. Bowel sounds are normal. There is no distension. Palpations: Abdomen is soft. There is no mass. Tenderness: There is no abdominal tenderness. There is no guarding or rebound. Hernia: No hernia is present. Musculoskeletal:         General: No swelling, tenderness, deformity or signs of injury.  Normal range of motion. Cervical back: Normal range of motion. Right lower leg: No edema. Left lower leg: No edema. Comments: Difficulty ambulating > unsteady on her feet    Skin:     General: Skin is warm. Neurological:      General: No focal deficit present. Mental Status: She is alert and oriented to person, place, and time. Mental status is at baseline. Psychiatric:         Mood and Affect: Mood normal.         Behavior: Behavior normal.         Thought Content: Thought content normal.         Judgment: Judgment normal.           Medications:  Scheduled Medications:    influenza virus vaccine  0.5 mL IntraMUSCular Prior to discharge    dexamethasone  6 mg IntraVENous Q24H    enoxaparin  30 mg SubCUTAneous BID    tocilizumab (ACTEMRA) IVPB  810 mg IntraVENous Once     Continuous Infusions:   PRN Medications     Diagnostic Labs:  CBC:   Recent Labs     12/27/21  1248   WBC 4.1   RBC 4.45   HGB 12.3   HCT 38.7   MCV 87.0   RDW 13.4        BMP:   Recent Labs     12/27/21  1237 12/27/21  1248   NA  --  137   K  --  3.9   CL  --  103   CO2  --  21   BUN  --  16   CREATININE 0.76 0.68     BNP: No results for input(s): BNP in the last 72 hours. PT/INR:   Recent Labs     12/27/21  1248   PROTIME 10.4   INR 1.0     APTT:   Recent Labs     12/27/21  1248   APTT 28.0     CARDIAC ENZYMES:   Recent Labs     12/27/21  1248   CKMB 4.3     FASTING LIPID PANEL:No results found for: CHOL, HDL, TRIG  LIVER PROFILE: No results for input(s): AST, ALT, ALB, BILIDIR, BILITOT, ALKPHOS in the last 72 hours. MICROBIOLOGY: No results found for: CULTURE    Imaging:    CT HEAD WO CONTRAST    Result Date: 12/27/2021  No acute intracranial abnormality. Possible acute on chronic right sphenoid sinusitis. CT CHEST WO CONTRAST    Result Date: 12/27/2021  1. Scattered ground-glass opacities bilaterally, highly suggestive of COVID-19 pulmonary disease given the clinical history 2.  Trace left pleural effusion 3. Bibasilar opacities, which may represent a combination of pneumonia and atelectasis. RECOMMENDATIONS: Unavailable     XR CHEST PORTABLE    Result Date: 12/27/2021  Mild pulmonary vascular congestion     CTA HEAD NECK W CONTRAST    Result Date: 12/27/2021  No large vessel occlusion in the head or neck. ASSESSMENT & PLAN     Assessment and Plan:    Principal Problem:    COVID-19  Active Problems:    Numbness and tingling of both legs below knees  Resolved Problems:    * No resolved hospital problems. *        IMPRESSION  This is a 66 y.o. female who presented with ataxia and B/L leg numbness and found to have COVID-19. Patient admitted to inpatient status for stroke work up and treatment of COVID-19.     Active Problems:  COVID-19  - Pt tested positive on 12/27  - Continue with decadron  - Continue to monitor O2 requirement and saturation   - Monitor inflammatory markers  - Follow up on CT chest   - B/L lower extremity venous dopplers  - ID consulted      B/L lower extremity numbness   - Stroke work up negative   - Trend troponin's   - UA and Urine culture   - Blood glucose goal less than 180  - Monitor BP > keep pt normotensive   - Neurology consulted      Tremor - stable         Diet: Carb controlled diet   DVT ppx: Lovenox   GI ppx: Pepcid     PT/OT/SW: Consulted   Discharge Planning: In process         David Tan MD  Internal Medicine Resident, PGY-1  [facility];  Tyree Brito   2:01 AM 12/28/2021

## 2021-12-28 NOTE — PLAN OF CARE
Problem: Airway Clearance - Ineffective  Goal: Achieve or maintain patent airway  12/28/2021 1627 by Alicia Ortiz RN  Outcome: Completed  12/28/2021 1044 by Alicia Ortiz RN  Outcome: Ongoing     Problem: Gas Exchange - Impaired  Goal: Absence of hypoxia  12/28/2021 1627 by Alicia Ortiz RN  Outcome: Completed  12/28/2021 1044 by Alicia Ortiz RN  Outcome: Ongoing  Goal: Promote optimal lung function  12/28/2021 1627 by Alicia Ortiz RN  Outcome: Completed  12/28/2021 1044 by Alicia Ortiz RN  Outcome: Ongoing     Problem: Breathing Pattern - Ineffective  Goal: Ability to achieve and maintain a regular respiratory rate  12/28/2021 1627 by Alicia Ortiz RN  Outcome: Completed  12/28/2021 1044 by Alicia Ortiz RN  Outcome: Ongoing     Problem:  Body Temperature -  Risk of, Imbalanced  Goal: Ability to maintain a body temperature within defined limits  12/28/2021 1627 by Alicia Ortiz RN  Outcome: Completed  12/28/2021 1044 by Alicia Ortiz RN  Outcome: Ongoing  Goal: Will regain or maintain usual level of consciousness  12/28/2021 1627 by Alicia Ortiz RN  Outcome: Completed  12/28/2021 1044 by Alicia Ortiz RN  Outcome: Ongoing  Goal: Complications related to the disease process, condition or treatment will be avoided or minimized  12/28/2021 1627 by Alicia Ortiz RN  Outcome: Completed  12/28/2021 1044 by Alicia Ortiz RN  Outcome: Ongoing     Problem: Isolation Precautions - Risk of Spread of Infection  Goal: Prevent transmission of infection  12/28/2021 1627 by Alicia Ortiz RN  Outcome: Completed  12/28/2021 1044 by Alicia Ortiz RN  Outcome: Ongoing     Problem: Nutrition Deficits  Goal: Optimize nutritional status  12/28/2021 1627 by Alicia Ortiz RN  Outcome: Completed  12/28/2021 1044 by Alicia Ortiz RN  Outcome: Ongoing     Problem: Risk for Fluid Volume Deficit  Goal: Maintain normal heart rhythm  12/28/2021 1627 by Alicia Ortiz RN  Outcome: Completed  12/28/2021 1044 by Noelle Smallwood Gregorio Hackett RN  Outcome: Ongoing  Goal: Maintain absence of muscle cramping  12/28/2021 1627 by Ulices Sandoval RN  Outcome: Completed  12/28/2021 1044 by Ulices Sandoval RN  Outcome: Ongoing  Goal: Maintain normal serum potassium, sodium, calcium, phosphorus, and pH  12/28/2021 1627 by Ulices Sandoval RN  Outcome: Completed  12/28/2021 1044 by Ulices Sandoval RN  Outcome: Ongoing     Problem: Loneliness or Risk for Loneliness  Goal: Demonstrate positive use of time alone when socialization is not possible  12/28/2021 1627 by Ulices Sandoval RN  Outcome: Completed  12/28/2021 1044 by Ulices Sandoval RN  Outcome: Ongoing     Problem: Fatigue  Goal: Verbalize increase energy and improved vitality  12/28/2021 1627 by Ulices Sandoval RN  Outcome: Completed  12/28/2021 1044 by Ulices Sandoval RN  Outcome: Ongoing     Problem: Patient Education: Go to Patient Education Activity  Goal: Patient/Family Education  12/28/2021 1627 by Ulices Sandoval RN  Outcome: Completed  12/28/2021 1044 by Ulices Sandoval RN  Outcome: Ongoing

## 2021-12-28 NOTE — PROGRESS NOTES
Speech Language Pathology  Facility/Department: [facility]   CLINICAL BEDSIDE SWALLOW EVALUATION    NAME: Susan Zuniga  : 1950  MRN: [id number]    ADMISSION DATE: 2021  ADMITTING DIAGNOSIS: has COVID-19; Numbness and tingling of both legs below knees; and UTI (urinary tract infection) on their problem list.    Date of Eval: 2021  Evaluating Therapist: Maryse Henderson, SLP    Current Diet level:  Current Diet : Regular  Current Liquid Diet : Thin      Primary ComplaintThe patient is a pleasant 66 y.o. female with a chief complaint of numbness and falling to the right. Pt was brought in by her daughter due to reports of symptoms lasting over three weeks. Her ambulation has progressively gotten worse and pt started leaning to the right over those three weeks. Pt had a sudden worsening with ambulation today that prompted the daughter to bring the pt to the ED. Today she had difficulty climbing the stairs in which she could previously do without any issues. Pt also complains of B/L numbness in the lower extremities up to the shin. Daughter stated to the ED resident that she noticed her mother speech was slurred. Neurology was consulted and stroke work up initiated. She denies any chest pain, shortness of breath, headache. Pt was found to be COVID-19 positive. Pt is not vaccinated against COVID-19.          Pain:  Pain Assessment  Pain Assessment: 0-10  Pain Level: 0  Patient's Stated Pain Goal: No pain    Reason for Referral  Susan Zuniga was referred for a bedside swallow evaluation to assess the efficiency of her swallow function, identify signs and symptoms of aspiration and make recommendations regarding safe dietary consistencies, effective compensatory strategies, and safe eating environment. Impression  Patient presents with probable safe swallow for Regular diet with thin liquids as evidenced by no overt s/s of aspiration noted with consistencies tested.   Recommend small sips and bites, only feed when alert and awake and upright at 90 degrees for all PO intake. Recommend close monitoring for overt/clinical s/s of aspiration and D/C PO intake and complete Modified Barium Swallow Study should they occur. Results and recommendations reported to RN. Dysphagia Diagnosis: Swallow function appears grossly intact  Dysphagia Outcome Severity Scale: Level 7: Normal in all situations     Treatment Plan  Requires SLP Intervention: Yes  Duration/Frequency of Treatment: 1-2x  D/C Recommendations: No therapy recommended at discharge. Recommended Diet and Intervention  Diet Solids Recommendation: Regular  Liquid Consistency Recommendation: Thin  Recommended Form of Meds: PO     Therapeutic Interventions: Diet tolerance monitoring;Patient/Family education    Treatment/Goals  Dysphagia Goals: The patient will tolerate recommended diet without observed clinical signs of aspiration    General  Chart Reviewed: Yes  Behavior/Cognition: Alert; Cooperative  Follows Directions: Complex  Dentition: Adequate  Patient Positioning: Upright in bed  Baseline Vocal Quality: Normal  Consistencies Administered: All         Vision/Hearing  Vision  Vision: Impaired  Vision Exceptions: Wears glasses at all times  Hearing  Hearing: Within functional limits    Oral Motor Deficits  Oral/Motor  Oral Motor: Within functional limits    Oral Phase Dysfunction  Oral Phase  Oral Phase: WNL     Indicators of Pharyngeal Phase Dysfunction   Pharyngeal Phase  Pharyngeal Phase: WNL   No overt s/s of aspiration with puree, soft solid, regular solid, thin liquid x4 by straw.      Prognosis  Prognosis  Prognosis for safe diet advancement: excellent  Individuals consulted  Consulted and agree with results and recommendations: Patient;RN    Education  Patient Education: yes  Patient Education Response: Verbalizes understanding             Therapy Time  SLP Individual Minutes  Time In: 0063  Time Out: 5420  Minutes: 6 Nicole [place], [place]  12/28/2021 10:59 AM

## 2021-12-28 NOTE — PLAN OF CARE
Problem: Airway Clearance - Ineffective  Goal: Achieve or maintain patent airway  12/28/2021 1044 by Todd Downing RN  Outcome: Ongoing  12/27/2021 2131 by Breanne Kincaid RN  Outcome: Ongoing     Problem: Gas Exchange - Impaired  Goal: Absence of hypoxia  12/28/2021 1044 by Todd Downing RN  Outcome: Ongoing  12/27/2021 2131 by Breanne Kincaid RN  Outcome: Ongoing  Goal: Promote optimal lung function  12/28/2021 1044 by Todd Downing RN  Outcome: Ongoing  12/27/2021 2131 by Breanne Kincaid RN  Outcome: Ongoing     Problem: Breathing Pattern - Ineffective  Goal: Ability to achieve and maintain a regular respiratory rate  12/28/2021 1044 by Todd Downing RN  Outcome: Ongoing  12/27/2021 2131 by Breanne Kincaid RN  Outcome: Ongoing     Problem:  Body Temperature -  Risk of, Imbalanced  Goal: Ability to maintain a body temperature within defined limits  12/28/2021 1044 by Todd Downing RN  Outcome: Ongoing  12/27/2021 2131 by Breanne Kincaid RN  Outcome: Ongoing  Goal: Will regain or maintain usual level of consciousness  12/28/2021 1044 by Todd Downing RN  Outcome: Ongoing  12/27/2021 2131 by Breanne Kincaid RN  Outcome: Ongoing  Goal: Complications related to the disease process, condition or treatment will be avoided or minimized  12/28/2021 1044 by Todd Downing RN  Outcome: Ongoing  12/27/2021 2131 by Breanne Kincaid RN  Outcome: Ongoing     Problem: Isolation Precautions - Risk of Spread of Infection  Goal: Prevent transmission of infection  12/28/2021 1044 by Todd Downing RN  Outcome: Ongoing  12/27/2021 2131 by Breanne Kincaid RN  Outcome: Ongoing     Problem: Nutrition Deficits  Goal: Optimize nutritional status  12/28/2021 1044 by Todd Downing RN  Outcome: Ongoing  12/27/2021 2131 by Breanne Kincaid RN  Outcome: Ongoing     Problem: Risk for Fluid Volume Deficit  Goal: Maintain normal heart rhythm  12/28/2021 1044 by Todd Downing RN  Outcome: Ongoing  12/27/2021 2131 by Madeleine Brunner, RN  Outcome: Ongoing  Goal: Maintain absence of muscle cramping  12/28/2021 1044 by Alicia Ortzi RN  Outcome: Ongoing  12/27/2021 2131 by Madeleine Brunner, RN  Outcome: Ongoing  Goal: Maintain normal serum potassium, sodium, calcium, phosphorus, and pH  12/28/2021 1044 by Alicia Ortiz RN  Outcome: Ongoing  12/27/2021 2131 by Madeleine Brunner, RN  Outcome: Ongoing     Problem: Loneliness or Risk for Loneliness  Goal: Demonstrate positive use of time alone when socialization is not possible  12/28/2021 1044 by Alicia Ortiz RN  Outcome: Ongoing  12/27/2021 2131 by Madeleine Brunner, RN  Outcome: Ongoing     Problem: Fatigue  Goal: Verbalize increase energy and improved vitality  12/28/2021 1044 by Alicia Ortiz RN  Outcome: Ongoing  12/27/2021 2131 by Madeleine Brunner, RN  Outcome: Ongoing     Problem: Patient Education: Go to Patient Education Activity  Goal: Patient/Family Education  12/28/2021 1044 by Alicia Ortiz RN  Outcome: Ongoing  12/27/2021 2131 by Madeleine Brunner, RN  Outcome: Ongoing

## 2021-12-28 NOTE — PROGRESS NOTES
CLINICAL PHARMACY NOTE: MEDS TO BEDS    Total # of Prescriptions Filled: 2   The following medications were delivered to the patient:  · DEXAMETHASONE 6   · FAMOTIDINE 20     Additional Documentation:    MEDS DELIVERED TO  ON FLOOR.  PT TERESO'T Radha Hernández

## 2021-12-29 ENCOUNTER — CARE COORDINATION (OUTPATIENT)
Dept: CASE MANAGEMENT | Age: 71
End: 2021-12-29

## 2021-12-29 DIAGNOSIS — U07.1 COVID-19: Primary | ICD-10-CM

## 2021-12-29 PROCEDURE — 1111F DSCHRG MED/CURRENT MED MERGE: CPT

## 2021-12-29 ASSESSMENT — ENCOUNTER SYMPTOMS
ABDOMINAL DISTENTION: 0
EYE REDNESS: 0
COLOR CHANGE: 0
APNEA: 0
COUGH: 0
SHORTNESS OF BREATH: 0

## 2021-12-29 NOTE — CARE COORDINATION
[facility] Transitions Initial Follow Up Call    Call within 2 business days of discharge: Yes    Patient: Jamil Hall Patient : 1950   MRN: <[id number]>  Reason for Admission:   Discharge Date: 21 RARS: Readmission Risk Score: 5.2 ( )      Last Discharge [facility]       Complaint Diagnosis Description Type Department Provider    21 Numbness; Gait Problem COVID-19 ED to Hosp-Admission (Discharged) (ADMITTED) Pablo Noriega 2 Cabrera Cavazos MD; Nafisa Sifuentes MD           Spoke with: [address] [facility] [place]    Non-face-to-face services provided:  Obtained and reviewed discharge summary and/or continuity of care documents  Assessment and support for treatment adherence and medication management-reviewed discharge instructions and medications, 1111f order, reviewed covid precautions and quarantine, healthcare decision makers reviewed, no vns, no HFU scheduled at this time, patient's daughter is going to get her an appointment with her pcp since patient doesnt have a pcp, patient has no c/o sob, pain, Ha, cp, n/v, f/c, body aches, just a little tired, exlained role of CTN, provided contact information, will follow//JU  Patient contacted regarding COVID-19 risk, exposure, diagnosis, pulse oximeter ordered at discharge and monoclonal antibody infusion follow up. Discussed COVID-19 related testing which was available at this time. Test results were positive. Patient informed of results, if available? Yes. Care Transition Nurse contacted the patient by telephone to perform post discharge assessment. Call within 2 business days of discharge: Yes. Verified name and  with patient as identifiers. Provided introduction to self, and explanation of the CTN/ACM role, and reason for call due to risk factors for infection and/or exposure to COVID-19. Symptoms reviewed with patient who verbalized the following symptoms: no new symptoms and no worsening symptoms.       Due to no new or worsening symptoms encounter was not routed to provider for escalation. Discussed follow-up appointments. If no appointment was previously scheduled, appointment scheduling offered: Yes. [facility] follow up appointment(s): No future appointments. Non-[facility] follow up appointment(s):     Non-face-to-face services provided:  Obtained and reviewed discharge summary and/or continuity of care documents     Advance Care Planning:   Does patient have an Advance Directive:  reviewed and current. Educated patient about risk for severe COVID-19 due to risk factors according to CDC guidelines. CTN reviewed discharge instructions, medical action plan and red flag symptoms with the patient who verbalized understanding. Discussed COVID vaccination status: Yes. Education provided on COVID-19 vaccination as appropriate. Discussed exposure protocols and quarantine with CDC Guidelines. Patient was given an opportunity to verbalize any questions and concerns and agrees to contact CTN or health care provider for questions related to their healthcare. Reviewed and educated patient on any new and changed medications related to discharge diagnosis     Was patient discharged with a pulse oximeter? No       CTN provided contact information. Plan for follow-up call in 5-7 days based on severity of symptoms and risk factors. Care Transitions 24 Hour Call    Schedule Follow Up Appointment with PCP: Declined  Do you have any ongoing symptoms?: No  Do you have a copy of your discharge instructions?: Yes  Do you have all of your prescriptions and are they filled?: Yes  Were you discharged with any Home Care or Post Acute Services: No  Do you feel like you have everything you need to keep you well at home?: Yes  Care Transitions Interventions         Follow Up  No future appointments.     Claudio Bocanegra RN

## 2021-12-29 NOTE — PROGRESS NOTES
Physician Progress Note      PATIENT:               Sophy RODRIGUES #:                  [id number]  :                       1950  ADMIT DATE:       2021 12:20 PM  [address] DATE:        2021 5:52 PM  RESPONDING  PROVIDER #:        Chiqui Mathew          QUERY TEXT:    Pt admitted with Covid 19 Pneumonia . Pt noted to have Hypoxia requiring   increased O2 supplement. If possible, please document in the progress notes   and discharge summary if you are evaluating and/or treating any of the   following: The medical record reflects the following:  Risk Factors: Covid Positive, Pneumonia  Clinical Indicators:to ED with SOB, weakness,decrease in   ambulation,tremors,slurred speech- Covid positive with Pneumonia. Hypoxic on RA   at 86% requiring O2- increased to 4L. pO2, Benny  23.4  O2 Sat, Benny  38 . Tachypnea. Treatment: ID & Neuro consults. Supplemental O2, stepdown monitoring  Options provided:  -- Acute respiratory failure with hypoxia  -- Acute respiratory failure with hypoxia and hypercapnia  -- Other - I will add my own diagnosis  -- Disagree - Not applicable / Not valid  -- Disagree - Clinically unable to determine / Unknown  -- Refer to Clinical Documentation Reviewer    PROVIDER RESPONSE TEXT:    This patient is in acute respiratory failure with hypoxia.     Query created by: Danny Díaz on 2021 2:29 PM      Electronically signed by:  Chiqui Mathew 2021 8:23 AM

## 2021-12-29 NOTE — PROGRESS NOTES
[facility] -   Infectious diseases evaluation  admission date 12/27/2021    reason for consultation:   covid pneumonia     Impression :   Current:  · covid illness - presumed pneumonia  w hypoxemia  · Elevated fibrinogen    Other:  ·   Discussion / summary of stay / plan of care   ·   Recommendations   · CT chest - covid + pneumonia   · Decadron 6 mg daily x10  · anticoag  · actemra x 1 12/27  · Get UA, started on ceftriaxone    Infection Control Recommendations   · [place] Precautions  · Contact Isolation   · Airborne isolation  · Droplet Isolation  ·     Antimicrobial Stewardship Recommendations   · Off AB    Coordination ofOutpatient Care:   · Estimated Length of IV antimicrobials:  · Patient will need Midline / picc Catheter Insertion:   · Patient will need SNF:  · Patient will need outpatient wound care:     History of Present Illness:   Initial history:  Daquan is a 66y.o.-year-old female x 2 weeks gait issues and fatigue, and came to ER also for a severe tremor increasing,   Very tired at exertion biut did not notice SOB and no cough  She tested + covid and not vaccinated  Saturated in ER 86 on RA  CXr congested and no clear covid picture    Passed swallow study bedside      Interval changes  12/29/2021   /71   Pulse 87   Temp 97.9 °F (36.6 °C) (Oral)   Resp 16   Ht 5' 8\" (1.727 m)   Wt 205 lb (93 kg)   SpO2 98%   BMI 31.17 kg/m²      Urine analysis positive, started on ceftriaxone  She denies dysuria  Saturation 96% pulse of 76, she is on room air feeling much better    Summary of relevant labs:  Labs:  WBC4.1   Hsnfpsfa149 High    Dimer, Quant0.47   Rredjwtlwh549 High    Procalcitonin0.05    High    CRP11.3 High      Micro:  SARS-CoV-2, RapidDETECTED Abnormal      Imaging:  CXR  Mild pulmonary vascular congestion     CT head  No acute intracranial abnormality. Possible acute on chronic right sphenoid sinusitis.        I have personally reviewed the past medical history, past surgical history, medications, social history, and family history, and I haveupdated the database accordingly. Allergies:   Patient has no known allergies. Review of Systems:     Review of Systems   Constitutional: Positive for activity change. HENT: Negative for congestion. Eyes: Negative for redness. Respiratory: Negative for apnea, cough and shortness of breath. Cardiovascular: Negative for chest pain. Gastrointestinal: Negative for abdominal distention. Endocrine: Negative for polyphagia. Genitourinary: Negative for dysuria, flank pain and frequency. Musculoskeletal: Negative for arthralgias. Skin: Negative for color change. Neurological: Positive for tremors, weakness and numbness. Psychiatric/Behavioral: Negative for agitation. Physical Examination :       Physical Exam  Constitutional:       Appearance: She is not ill-appearing. HENT:      Head: Normocephalic and atraumatic. Nose: Nose normal.      Mouth/Throat:      Mouth: Mucous membranes are moist.   Eyes:      General: No scleral icterus. Conjunctiva/sclera: Conjunctivae normal.   Cardiovascular:      Rate and Rhythm: Normal rate and regular rhythm. Heart sounds: Normal heart sounds. No murmur heard. Pulmonary:      Effort: No respiratory distress. Breath sounds: Normal breath sounds. Abdominal:      General: There is no distension. Palpations: Abdomen is soft. There is no mass. Genitourinary:     Comments: Urine dimitry  Musculoskeletal:         General: No swelling or deformity. Cervical back: Neck supple. No rigidity. Skin:     General: Skin is dry. Coloration: Skin is not jaundiced or pale. Findings: No erythema. Neurological:      General: No focal deficit present. Mental Status: She is alert and oriented to person, place, and time.    Psychiatric:         Mood and Affect: Mood normal.         Thought Content: Thought content normal.         Past Medical History:     Past Medical History:   Diagnosis Date    [place] (hard of hearing)     on 7/25/19 pt states she left her hearing aides at home       Past Surgical  History:     Past Surgical History:   Procedure Laterality Date    HYSTERECTOMY         Medications:       Social History:     Social History     Socioeconomic History    Marital status: Single     Spouse name: Not on file    Number of children: Not on file    Years of education: Not on file    Highest education level: Not on file   Occupational History    Not on file   Tobacco Use    Smoking status: Never Smoker    Smokeless tobacco: Not on file   Substance and Sexual Activity    Alcohol use: Never    Drug use: Never    Sexual activity: Not on file   Other Topics Concern    Not on file   Social History Narrative    Not on file     Social Determinants of Health     Financial Resource Strain:     Difficulty of Paying Living Expenses: Not on file   Food Insecurity:     Worried About [address] in the Last Year: Not on file    [address] in the Last Year: Not on file   Transportation Needs:     Lack of Transportation (Medical): Not on file    Lack of Transportation (Non-Medical):  Not on file   Physical Activity:     Days of Exercise per Week: Not on file    Minutes of Exercise per Session: Not on file   Stress:     Feeling of Stress : Not on file   Social Connections:     Frequency of Communication with Friends and Family: Not on file    Frequency of Social Gatherings with Friends and Family: Not on file    Attends Taoism Services: Not on file    Active Member of Clubs or Organizations: Not on file    Attends Club or Organization Meetings: Not on file    Marital Status: Not on file   Intimate Partner Violence:     Fear of Current or Ex-Partner: Not on file    Emotionally Abused: Not on file    Physically Abused: Not on file    Sexually Abused: Not on file Housing Stability:     Unable to Pay for Housing in the Last Year: Not on file    Number of Places Lived in the Last Year: Not on file    Unstable Housing in the Last Year: Not on file       Family History:   No family history on file. Medical Decision Making:   I have independently reviewed/ordered the following labs:    CBC with Differential:   Recent Labs     12/27/21  1248   WBC 4.1   HGB 12.3   HCT 38.7      LYMPHOPCT 16*   MONOPCT 7     BMP:  Recent Labs     12/27/21  1237 12/27/21  1248   NA  --  137   K  --  3.9   CL  --  103   CO2  --  21   BUN  --  16   CREATININE 0.76 0.68     Hepatic Function Panel: No results for input(s): PROT, LABALBU, BILIDIR, IBILI, BILITOT, ALKPHOS, ALT, AST in the last 72 hours. No results for input(s): RPR in the last 72 hours. No results for input(s): HIV in the last 72 hours. No results for input(s): BC in the last 72 hours. Lab Results   Component Value Date    CREATININE 0.68 12/27/2021    CREATININE 0.76 12/27/2021    GLUCOSE 130 12/27/2021       Detailed results: Thank you for allowing us to participate in the care of this patient. Please call with questions. This note is created with the assistance of a speech recognition program.  While intending to generate adocument that actually reflects the content of the visit, the document can still have some errors including those of syntax and sound a like substitutions which may escape proof reading. It such instances, actual meaningcan be extrapolated by contextual diversion.     Abigail Bright MD  Office: [phone]  Perfect serve / office [phone]

## 2021-12-30 LAB
CULTURE: ABNORMAL
Lab: ABNORMAL
SPECIMEN DESCRIPTION: ABNORMAL

## 2021-12-30 NOTE — CARE COORDINATION
BPCI-A Medical Bundle Patient:  Working DRG: (COVID) pneumonia-  (found after discharge)- follow 30 days d/t ICD-10 code U07.1  Admitting Location: [facility]   Adm Date: 12/27/2021  Date of Discharge: 12/28/2021- to home    Bundle End Date: 3/27/2022    90-day post-acute outreach and tracking with qualifying DRG.        Found as BPCI after discharge

## 2021-12-31 NOTE — DISCHARGE SUMMARY
[address]     Department of Internal Medicine - Staff Internal Medicine Teaching Service    INPATIENT DISCHARGE SUMMARY      Patient Identification:  Nino Branch is a 66 y.o. female. :  1950  MRN: [id number]     Acct: [de-identified]   PCP: No primary care provider on file. Admit Date:  2021  Discharge date and time: 2021  5:52 PM   Attending Provider: No att. providers found                                     [facility] Problem Lists:  Principal Problem:    COVID-19  Active Problems:    Numbness and tingling of both legs below knees    UTI (urinary tract infection)    Generalized weakness  Resolved Problems:    * No resolved hospital problems. *      HOSPITAL STAY     Brief Inpatient course:   Nino Branch is a 66 y.o. female who was admitted for the management of COVID-19, presented to the emergency department with numbness and falling to the right. Pt was brought in by her daughter due to reports of symptoms lasting over three weeks. Her ambulation has progressively gotten worse and pt started leaning to the right over those three weeks. Pt had a sudden worsening with ambulation today that prompted the daughter to bring the pt to the ED. Today she had difficulty climbing the stairs in which she could previously do without any issues. Pt also complains of B/L numbness in the lower extremities up to the shin. Daughter stated to the ED resident that she noticed her mother speech was slurred. Neurology was consulted and stroke work up initiated which was negative. Pt also had a tremor during presentation which resolved by the time I examined the pt. She denied any chest pain, shortness of breath, headache. Pt was found to be COVID-19 positive. Pt is not vaccinated against COVID-19. Hospital course:   Active Problems:  COVID-19  - Pt tested positive on   - Received Decadron 6mg for total of 10 days   - Follow up on CT chest > scattered ground glass opacities bilaterally, highly suggestive COVID-19 pulmonary disease. Trace left pleural effusions. Bibasilar opacities which may represent a combination of pneumonia and atelectasis.  - B/L lower extremity venous dopplers > no evidence of superficial or deep venous thrombosis bilaterally  - ID evaluated and treated      B/L lower extremity numbness - improved     - Stroke work up negative   - Neurology evaluated     Urinary tract infection   - Urine culture grew E. Coli in greater than 100,000 CFU  - Started on Ceftriaxone per ID    Tremor - stable    Discharge diagnosis: COVID-19 pneumonia     Procedures/ Significant Interventions:   B/L venous dopplers of lower extremities   CT scan of the chest   Stroke work-up     Consults:     Consults:     Final Specialist Recommendations/Findings:   IP CONSULT TO INTERNAL MEDICINE  IP CONSULT TO NEUROLOGY  IP CONSULT TO INFECTIOUS DISEASES  IP CONSULT TO INFECTIOUS DISEASES  IP CONSULT TO CASE MANAGEMENT      Any Hospital Acquired Infections: none    Discharge Functional Status:  stable    DISCHARGE PLAN     Disposition: home    Patient Instructions:   Discharge Medication List as of 12/28/2021  4:38 PM      START taking these medications    Details   dexamethasone (DECADRON) 6 MG tablet Take 1 tablet by mouth every 6 hours for 9 days, Disp-36 tablet, R-0Normal      famotidine (PEPCID) 20 MG tablet Take 1 tablet by mouth 2 times daily, Disp-60 tablet, R-3Normal             Activity: activity as tolerated    Diet: regular diet    Follow-up:  Derrick Burnette MD  [address] [place] [address].    R SHIRIN Blake  [address] [phone]    Call in 1 week  hospital f/u      Patient Instructions:   Continue taking PO decadron to complete 10 days  Follow up with PCP  Return to hospital if become short of breath again  Avoid social gatherings     Follow up labs: None   Follow up imaging: None     Note that over 30 minutes was spent in preparing discharge papers, discussing discharge with patient, medication review, etc.    Thank you for allowing me to participate in your care. Moy Feliz MD  Internal Medicine Resident, PGY-1  [address] [place], [place]  12/31/2021, 10:56 AM

## 2022-01-05 DIAGNOSIS — N30.00 ACUTE CYSTITIS WITHOUT HEMATURIA: Primary | ICD-10-CM

## 2022-01-05 RX ORDER — CEPHALEXIN 500 MG/1
500 CAPSULE ORAL 4 TIMES DAILY
Qty: 28 CAPSULE | Refills: 0 | Status: SHIPPED | OUTPATIENT
Start: 2022-01-05 | End: 2022-01-12

## 2022-01-05 RX ORDER — CEPHALEXIN 500 MG/1
500 CAPSULE ORAL 4 TIMES DAILY
Qty: 28 CAPSULE | Refills: 0 | Status: SHIPPED | OUTPATIENT
Start: 2022-01-05 | End: 2022-01-05 | Stop reason: SDUPTHER

## 2022-01-05 NOTE — PROGRESS NOTES
Patient's urine culture from 12/28/21 grew E. Coli >50,000 CFU, sensitive to cephalosporins. Ordered oral Keflex 500 mg 4 times daily for 7 days. Called patient but she did not  her phone, then called patient's daughter Lm Hernández and informed her about the prescription, she said she will pick it up and will ensure that her mother knows of the UTI results and takes the Abx as prescribed.     Mallory Russo  PGY-2  Internal Medicine  [address] [place] [address]  1/5/2022 1:56 PM

## 2022-01-27 PROBLEM — N39.0 UTI (URINARY TRACT INFECTION): Status: RESOLVED | Noted: 2021-12-28 | Resolved: 2022-01-27

## 2022-02-01 NOTE — H&P
[address]     Department of Internal Medicine - Staff Internal Medicine Teaching Service          ADMISSION NOTE/HISTORY AND PHYSICAL EXAMINATION   Date: 12/27/2021  Patient Name: Juanita Tsai  Date of admission: 12/27/2021 12:20 PM  YOB: 1950  PCP: No primary care provider on file. History Obtained From:  patient, electronic medical record    CHIEF COMPLAINT     Chief complaint: Numbness and falling to the right    HISTORY OF PRESENTING ILLNESS     The patient is a pleasant 66 y.o. female with a chief complaint of numbness and falling to the right. Pt was brought in by her daughter due to reports of symptoms lasting over three weeks. Her ambulation has progressively gotten worse and pt started leaning to the right over those three weeks. Pt had a sudden worsening with ambulation today that prompted the daughter to bring the pt to the ED. Today she had difficulty climbing the stairs in which she could previously do without any issues. Pt also complains of B/L numbness in the lower extremities up to the shin. Daughter stated to the ED resident that she noticed her mother speech was slurred. Neurology was consulted and stroke work up initiated. She denies any chest pain, shortness of breath, headache. Pt was found to be COVID-19 positive. Pt is not vaccinated against COVID-19. Review of Systems:  Review of Systems   Constitutional: Negative for chills and fever. HENT: Positive for congestion. Negative for dental problem, drooling, facial swelling, rhinorrhea, sinus pressure, sinus pain, sneezing, sore throat and tinnitus. Eyes: Negative. Respiratory: Negative for cough, choking, chest tightness, shortness of breath and wheezing. Cardiovascular: Negative for chest pain, palpitations and leg swelling. Gastrointestinal: Negative for abdominal distention, abdominal pain, blood in stool, constipation, diarrhea, nausea and vomiting.    Endocrine: Negative. Genitourinary: Negative for difficulty urinating, frequency, hematuria and urgency. Musculoskeletal: Positive for gait problem. Negative for arthralgias, back pain, joint swelling and myalgias. Neurological: Positive for tremors and numbness. Negative for dizziness, seizures, syncope, facial asymmetry, speech difficulty, weakness, light-headedness and headaches. Hematological: Negative. Psychiatric/Behavioral: Negative for agitation, behavioral problems, confusion, decreased concentration, dysphoric mood, hallucinations, self-injury, sleep disturbance and suicidal ideas. The patient is not nervous/anxious and is not hyperactive. PAST MEDICAL HISTORY     Past Medical History:   Diagnosis Date    [place] (hard of hearing)     on 17 pt states she left her hearing aides at home       PAST SURGICAL HISTORY     Past Surgical History:   Procedure Laterality Date    HYSTERECTOMY         ALLERGIES     Patient has no known allergies. MEDICATIONS PRIOR TO ADMISSION     Prior to Admission medications    Not on File       SOCIAL HISTORY     Tobacco: Denies   Alcohol: Denies   Illicits: Denies   Occupation: Unemployed     FAMILY HISTORY     No family history on file. PHYSICAL EXAM     Vitals: BP (!) 130/59   Pulse 73   Temp 99.3 °F (37.4 °C) (Oral)   Resp 15   Ht 5' 8\" (1.727 m)   Wt 205 lb (93 kg)   SpO2 94%   BMI 31.17 kg/m²   Tmax: Temp (24hrs), Av.3 °F (37.4 °C), Min:99.3 °F (37.4 °C), Max:99.3 °F (37.4 °C)    Last Body weight:   Wt Readings from Last 3 Encounters:   21 205 lb (93 kg)   21 220 lb (99.8 kg)   19 218 lb (98.9 kg)     Body Mass Index : Body mass index is 31.17 kg/m². PHYSICAL EXAMINATION:  Physical Exam  Vitals and nursing note reviewed. Constitutional:       Appearance: She is obese. HENT:      Head: Normocephalic and atraumatic.       Nose: Nose normal.      Mouth/Throat:      Mouth: Mucous membranes are moist.      Pharynx: Oropharynx is clear. Eyes:      Extraocular Movements: Extraocular movements intact. Conjunctiva/sclera: Conjunctivae normal.      Pupils: Pupils are equal, round, and reactive to light. Cardiovascular:      Rate and Rhythm: Normal rate and regular rhythm. Pulses: Normal pulses. Heart sounds: Normal heart sounds. No murmur heard. No friction rub. No gallop. Pulmonary:      Effort: Pulmonary effort is normal. No respiratory distress. Breath sounds: Normal breath sounds. No stridor. No wheezing, rhonchi or rales. Chest:      Chest wall: No tenderness. Abdominal:      General: Abdomen is flat. Bowel sounds are normal. There is no distension. Palpations: Abdomen is soft. There is no mass. Tenderness: There is no abdominal tenderness. There is no guarding or rebound. Hernia: No hernia is present. Musculoskeletal:         General: No swelling, tenderness, deformity or signs of injury. Normal range of motion. Cervical back: Normal range of motion. Right lower leg: No edema. Left lower leg: No edema. Skin:     General: Skin is warm. Neurological:      General: No focal deficit present. Mental Status: She is alert and oriented to person, place, and time. Mental status is at baseline. Comments: B/L numbness in the lower extremities up to the shin    Psychiatric:         Mood and Affect: Mood normal.         Behavior: Behavior normal.         Thought Content:  Thought content normal.         Judgment: Judgment normal.         INVESTIGATIONS     Laboratory Testing:     Recent Results (from the past 24 hour(s))   Venous Blood Gas, POC    Collection Time: 12/27/21 12:37 PM   Result Value Ref Range    pH, Benny 7.355 7.320 - 7.430    pCO2, Benny 51.0 41.0 - 51.0 mm Hg    pO2, Benny 23.4 (L) 30.0 - 50.0 mm Hg    HCO3, Venous 28.5 22.0 - 29.0 mmol/L    Total CO2, Venous NOT REPORTED 23.0 - 30.0 mmol/L    Negative Base Excess, Benny NOT REPORTED 0.0 - 2.0 Positive Base Excess, Benny 2 0.0 - 3.0    O2 Sat, Benny 38 (L) 60.0 - 85.0 %    O2 Device/Flow/% NOT REPORTED     Wesly Test NOT REPORTED     Sample Site NOT REPORTED     Mode NOT REPORTED     FIO2 NOT REPORTED     Pt Temp NOT REPORTED     POC pH Temp NOT REPORTED     POC pCO2 Temp NOT REPORTED mm Hg    POC pO2 Temp NOT REPORTED mm Hg   ELECTROLYTES PLUS    Collection Time: 12/27/21 12:37 PM   Result Value Ref Range    POC Sodium 143 138 - 146 mmol/L    POC Potassium 4.0 3.5 - 4.5 mmol/L    POC Chloride 105 98 - 107 mmol/L    POC TCO2 29 22 - 30 mmol/L    Anion Gap 10 7 - 16 mmol/L   Hemoglobin and hematocrit, blood    Collection Time: 12/27/21 12:37 PM   Result Value Ref Range    POC Hemoglobin 14.3 12.0 - 16.0 g/dL    POC Hematocrit 42 36 - 46 %   Creatinine W/GFR Point of Care    Collection Time: 12/27/21 12:37 PM   Result Value Ref Range    POC Creatinine 0.76 0.51 - 1.19 mg/dL    GFR Comment >60 >60 mL/min    GFR Non-African American >60 >60 mL/min    GFR Comment         CALCIUM, IONIC (POC)    Collection Time: 12/27/21 12:37 PM   Result Value Ref Range    POC Ionized Calcium 1.24 1.15 - 1.33 mmol/L   POCT urea (BUN)    Collection Time: 12/27/21 12:37 PM   Result Value Ref Range    POC BUN 16 8 - 26 mg/dL   Lactic Acid, POC    Collection Time: 12/27/21 12:37 PM   Result Value Ref Range    POC Lactic Acid 0.77 0.56 - 1.39 mmol/L   POCT Glucose    Collection Time: 12/27/21 12:37 PM   Result Value Ref Range    POC Glucose 133 (H) 74 - 100 mg/dL   COVID-19, Rapid    Collection Time: 12/27/21 12:47 PM    Specimen: Nasopharyngeal Swab   Result Value Ref Range    Specimen Description . NASOPHARYNGEAL SWAB     SARS-CoV-2, Rapid DETECTED (A) Not Detected   STROKE PANEL    Collection Time: 12/27/21 12:48 PM   Result Value Ref Range    Glucose 130 (H) 70 - 99 mg/dL    BUN 16 8 - 23 mg/dL    CREATININE 0.68 0.50 - 0.90 mg/dL    Bun/Cre Ratio NOT REPORTED 9 - 20    Calcium 8.9 8.6 - 10.4 mg/dL    Sodium 137 135 - 144 mmol/L Potassium 3.9 3.7 - 5.3 mmol/L    Chloride 103 98 - 107 mmol/L    CO2 21 20 - 31 mmol/L    Anion Gap 13 9 - 17 mmol/L    GFR Non-African American >60 >60 mL/min    GFR African American >60 >60 mL/min    GFR Comment          GFR Staging NOT REPORTED     WBC 4.1 3.5 - 11.3 k/uL    RBC 4.45 3.95 - 5.11 m/uL    Hemoglobin 12.3 11.9 - 15.1 g/dL    Hematocrit 38.7 36.3 - 47.1 %    MCV 87.0 82.6 - 102.9 fL    MCH 27.6 25.2 - 33.5 pg    MCHC 31.8 28.4 - 34.8 g/dL    RDW 13.4 11.8 - 14.4 %    Platelets 324 972 - 117 k/uL    MPV 11.7 8.1 - 13.5 fL    NRBC Automated 0.0 0.0 per 100 WBC    Total  (H) 26 - 192 U/L    CK-MB 4.3 <5.4 ng/mL    % CKMB 1.5 0.0 - 3.0 %    CKMB Interpretation COMPATIBLE WITH SKELETAL MUSCLE ORIGIN     Differential Type NOT REPORTED     WBC Morphology NOT REPORTED     RBC Morphology NOT REPORTED     Platelet Estimate NOT REPORTED     Seg Neutrophils 77 (H) 36 - 65 %    Lymphocytes 16 (L) 24 - 43 %    Monocytes 7 3 - 12 %    Eosinophils % 0 (L) 1 - 4 %    Basophils 0 0 - 2 %    Immature Granulocytes 0 0 %    Segs Absolute 3.19 1.50 - 8.10 k/uL    Absolute Lymph # 0.66 (L) 1.10 - 3.70 k/uL    Absolute Mono # 0.27 0.10 - 1.20 k/uL    Absolute Eos # <0.03 0.00 - 0.44 k/uL    Basophils Absolute <0.03 0.00 - 0.20 k/uL    Absolute Immature Granulocyte <0.03 0.00 - 0.30 k/uL    Myoglobin 98 (H) 25 - 58 ng/mL    Protime 10.4 9.1 - 12.3 sec    INR 1.0     PTT 28.0 20.5 - 30.5 sec    Troponin, High Sensitivity 19 (H) 0 - 14 ng/L    Troponin T NOT REPORTED <0.03 ng/mL    Troponin Interp NOT REPORTED    C-Reactive Protein    Collection Time: 12/27/21 12:48 PM   Result Value Ref Range    CRP 11.3 (H) 0.0 - 5.0 mg/L   D-Dimer, Quantitative    Collection Time: 12/27/21 12:48 PM   Result Value Ref Range    D-Dimer, Quant 0.47 mg/L FEU   Ferritin    Collection Time: 12/27/21 12:48 PM   Result Value Ref Range    Ferritin 298 (H) 13 - 150 ug/L   Fibrinogen    Collection Time: 12/27/21 12:48 PM   Result Value Ref Range    Fibrinogen 435 (H) 140 - 420 mg/dL   Lactate Dehydrogenase    Collection Time: 12/27/21 12:48 PM   Result Value Ref Range     (H) 135 - 214 U/L   Procalcitonin    Collection Time: 12/27/21 12:48 PM   Result Value Ref Range    Procalcitonin 0.05 <0.09 ng/mL       Imaging:   CT HEAD WO CONTRAST    Result Date: 12/27/2021  No acute intracranial abnormality. Possible acute on chronic right sphenoid sinusitis. XR CHEST PORTABLE    Result Date: 12/27/2021  Mild pulmonary vascular congestion     CTA HEAD NECK W CONTRAST    Result Date: 12/27/2021  No large vessel occlusion in the head or neck. ASSESSMENT & PLAN     ASSESSMENT / PLAN:     IMPRESSION  This is a 66 y.o. female who presented with ataxia and B/L leg numbness and found to have COVID-19. Patient admitted to inpatient status for stroke work up and treatment of COVID-19. Active Problems:  COVID-19  - Pt tested positive on 12/27  - Continue with decadron  - Continue to monitor O2 requirement and saturation   - Monitor inflammatory markers  - Follow up on CT chest   - B/L lower extremity venous dopplers  - ID consulted     B/L lower extremity numbness   - Stroke work up negative   - Trend troponin's   - UA and Urine culture   - Blood glucose goal less than 180  - Monitor BP > keep pt normotensive   - Neurology consulted     Tremor - stable       Diet: Carb controlled diet   DVT ppx: Lovenox   GI ppx: Pepcid    PT/OT/SW: Consulted   Discharge Planning: In process       Devin Wood MD  Internal Medicine Resident, PGY-1  [place];  [place], [place]  12/27/2021, 5:59 PM 37 yo female w/PMH of HIV (on Bactrim) presents to the ED for pressure behind right eye and right sided headache x2 days. Denies head or neck injury. States she has not had this pain prior. Pt was COVID+ x3 weeks ago. 39 yo female w/PMH of HIV (on Bactrim) presents to the ED for pressure behind right eye and right sided headache x2 days. Denies head or neck injury. States she has not had this pain prior. Denies fever/chills, cough, SOB, CP or abdominal pain. Pt was COVID+ x3 weeks ago.

## 2022-03-03 ENCOUNTER — HOSPITAL ENCOUNTER (OUTPATIENT)
Dept: MAMMOGRAPHY | Age: 72
Discharge: HOME | End: 2022-03-05
Payer: MEDICARE

## 2022-03-03 DIAGNOSIS — Z12.39 SCREENING BREAST EXAMINATION: ICD-10-CM

## 2022-03-03 PROCEDURE — 77063 BREAST TOMOSYNTHESIS BI: CPT

## 2023-12-13 ENCOUNTER — HOSPITAL ENCOUNTER (EMERGENCY)
Age: 73
Discharge: HOME | End: 2023-12-13
Attending: EMERGENCY MEDICINE
Payer: MEDICARE

## 2023-12-13 ENCOUNTER — APPOINTMENT (OUTPATIENT)
Dept: CT IMAGING | Age: 73
End: 2023-12-13
Payer: MEDICARE

## 2023-12-13 VITALS
TEMPERATURE: 97.7 F | BODY MASS INDEX: 33.45 KG/M2 | RESPIRATION RATE: 16 BRPM | OXYGEN SATURATION: 100 % | DIASTOLIC BLOOD PRESSURE: 78 MMHG | WEIGHT: 220 LBS | HEART RATE: 96 BPM | SYSTOLIC BLOOD PRESSURE: 140 MMHG

## 2023-12-13 DIAGNOSIS — R10.9 ABDOMINAL PAIN, UNSPECIFIED ABDOMINAL LOCATION: Primary | ICD-10-CM

## 2023-12-13 DIAGNOSIS — K62.89 ANAL PAIN: ICD-10-CM

## 2023-12-13 DIAGNOSIS — K59.00 CONSTIPATION, UNSPECIFIED CONSTIPATION TYPE: ICD-10-CM

## 2023-12-13 LAB
ALBUMIN SERPL-MCNC: 3.9 G/DL (ref 3.5–5.2)
ALP SERPL-CCNC: 88 U/L (ref 35–104)
ALT SERPL-CCNC: 14 U/L (ref 5–33)
ANION GAP SERPL CALCULATED.3IONS-SCNC: 11 MMOL/L (ref 9–17)
AST SERPL-CCNC: 16 U/L
BACTERIA URNS QL MICRO: ABNORMAL
BASOPHILS # BLD: 0.03 K/UL (ref 0–0.2)
BASOPHILS NFR BLD: 1 % (ref 0–2)
BILIRUB DIRECT SERPL-MCNC: 0.1 MG/DL
BILIRUB INDIRECT SERPL-MCNC: 0.2 MG/DL (ref 0–1)
BILIRUB SERPL-MCNC: 0.3 MG/DL (ref 0.3–1.2)
BILIRUB UR QL STRIP: NEGATIVE
BUN SERPL-MCNC: 7 MG/DL (ref 8–23)
BUN/CREAT SERPL: 14 (ref 9–20)
CALCIUM SERPL-MCNC: 9.6 MG/DL (ref 8.6–10.4)
CHLORIDE SERPL-SCNC: 102 MMOL/L (ref 98–107)
CLARITY UR: ABNORMAL
CO2 SERPL-SCNC: 26 MMOL/L (ref 20–31)
COLOR UR: YELLOW
CREAT SERPL-MCNC: 0.5 MG/DL (ref 0.5–0.9)
EOSINOPHIL # BLD: 0.05 K/UL (ref 0–0.44)
EOSINOPHILS RELATIVE PERCENT: 1 % (ref 1–4)
EPI CELLS #/AREA URNS HPF: ABNORMAL /HPF (ref 0–5)
ERYTHROCYTE [DISTWIDTH] IN BLOOD BY AUTOMATED COUNT: 13.1 % (ref 11.8–14.4)
GFR SERPL CREATININE-BSD FRML MDRD: >60 ML/MIN/1.73M2
GLUCOSE SERPL-MCNC: 105 MG/DL (ref 70–99)
GLUCOSE UR STRIP-MCNC: NEGATIVE MG/DL
HCT VFR BLD AUTO: 40 % (ref 36.3–47.1)
HGB BLD-MCNC: 12.7 G/DL (ref 11.9–15.1)
HGB UR QL STRIP.AUTO: NEGATIVE
IMM GRANULOCYTES # BLD AUTO: 0.02 K/UL (ref 0–0.3)
IMM GRANULOCYTES NFR BLD: 0 %
INR PPP: 1
KETONES UR STRIP-MCNC: NEGATIVE MG/DL
LEUKOCYTE ESTERASE UR QL STRIP: ABNORMAL
LIPASE SERPL-CCNC: 49 U/L (ref 13–60)
LYMPHOCYTES NFR BLD: 1.01 K/UL (ref 1.1–3.7)
LYMPHOCYTES RELATIVE PERCENT: 16 % (ref 24–43)
MCH RBC QN AUTO: 28 PG (ref 25.2–33.5)
MCHC RBC AUTO-ENTMCNC: 31.8 G/DL (ref 28.4–34.8)
MCV RBC AUTO: 88.3 FL (ref 82.6–102.9)
MONOCYTES NFR BLD: 0.28 K/UL (ref 0.1–1.2)
MONOCYTES NFR BLD: 5 % (ref 3–12)
NEUTROPHILS NFR BLD: 77 % (ref 36–65)
NEUTS SEG NFR BLD: 4.89 K/UL (ref 1.5–8.1)
NITRITE UR QL STRIP: NEGATIVE
NRBC BLD-RTO: 0 PER 100 WBC
PH UR STRIP: 6 [PH] (ref 5–8)
PLATELET # BLD AUTO: 238 K/UL (ref 138–453)
PMV BLD AUTO: 11.1 FL (ref 8.1–13.5)
POTASSIUM SERPL-SCNC: 4.2 MMOL/L (ref 3.7–5.3)
PROT SERPL-MCNC: 7.3 G/DL (ref 6.4–8.3)
PROT UR STRIP-MCNC: NEGATIVE MG/DL
PROTHROMBIN TIME: 12.7 SEC (ref 11.5–14.2)
RBC # BLD AUTO: 4.53 M/UL (ref 3.95–5.11)
RBC #/AREA URNS HPF: ABNORMAL /HPF (ref 0–2)
SODIUM SERPL-SCNC: 139 MMOL/L (ref 135–144)
SP GR UR STRIP: 1.01 (ref 1–1.03)
UROBILINOGEN UR STRIP-ACNC: NORMAL EU/DL (ref 0–1)
WBC #/AREA URNS HPF: ABNORMAL /HPF (ref 0–5)
WBC OTHER # BLD: 6.3 K/UL (ref 3.5–11.3)

## 2023-12-13 PROCEDURE — 2580000003 HC RX 258: Performed by: EMERGENCY MEDICINE

## 2023-12-13 PROCEDURE — 74177 CT ABD & PELVIS W/CONTRAST: CPT

## 2023-12-13 PROCEDURE — 85610 PROTHROMBIN TIME: CPT

## 2023-12-13 PROCEDURE — 51701 INSERT BLADDER CATHETER: CPT

## 2023-12-13 PROCEDURE — 80048 BASIC METABOLIC PNL TOTAL CA: CPT

## 2023-12-13 PROCEDURE — 81001 URINALYSIS AUTO W/SCOPE: CPT

## 2023-12-13 PROCEDURE — 85025 COMPLETE CBC W/AUTO DIFF WBC: CPT

## 2023-12-13 PROCEDURE — 83690 ASSAY OF LIPASE: CPT

## 2023-12-13 PROCEDURE — 6360000004 HC RX CONTRAST MEDICATION: Performed by: EMERGENCY MEDICINE

## 2023-12-13 PROCEDURE — 99285 EMERGENCY DEPT VISIT HI MDM: CPT

## 2023-12-13 PROCEDURE — 80076 HEPATIC FUNCTION PANEL: CPT

## 2023-12-13 PROCEDURE — 6370000000 HC RX 637 (ALT 250 FOR IP): Performed by: EMERGENCY MEDICINE

## 2023-12-13 RX ORDER — 0.9 % SODIUM CHLORIDE 0.9 %
80 INTRAVENOUS SOLUTION INTRAVENOUS ONCE
Status: DISCONTINUED | OUTPATIENT
Start: 2023-12-13 | End: 2023-12-13 | Stop reason: HOSPADM

## 2023-12-13 RX ORDER — ALPRAZOLAM 0.25 MG/1
0.25 TABLET ORAL ONCE
Status: COMPLETED | OUTPATIENT
Start: 2023-12-13 | End: 2023-12-13

## 2023-12-13 RX ORDER — SODIUM CHLORIDE 0.9 % (FLUSH) 0.9 %
10 SYRINGE (ML) INJECTION PRN
Status: DISCONTINUED | OUTPATIENT
Start: 2023-12-13 | End: 2023-12-13 | Stop reason: HOSPADM

## 2023-12-13 RX ADMIN — SODIUM CHLORIDE, PRESERVATIVE FREE 10 ML: 5 INJECTION INTRAVENOUS at 17:23

## 2023-12-13 RX ADMIN — IOPAMIDOL 75 ML: 755 INJECTION, SOLUTION INTRAVENOUS at 17:23

## 2023-12-13 RX ADMIN — ALPRAZOLAM 0.25 MG: 0.25 TABLET ORAL at 17:10

## 2023-12-13 NOTE — ED NOTES
Patient presents to ER from home due to constipations. Patient daughter states she thinks patient may have an obstruction. Patient daughter states last BM 12/10. Daughter states patient took multiple laxatives and stool softeners. Patient is A/O x 4, equal chest expansion with non labored breathing, wheels locked, bed in lowest position, call light in reach.      Gris Paz RN  12/13/23 0934

## 2023-12-13 NOTE — ED PROVIDER NOTES
[address] [address] [phone]  Go to   As needed, If symptoms worsen    [address] [address] [facility] [place], [address]  [address] [id number] [phone]    Schedule an appointment as soon as possible for a visit in 2 days        DO Korey Wilson Sami, DO  12/13/23 2009